# Patient Record
Sex: FEMALE | Race: WHITE | NOT HISPANIC OR LATINO | Employment: FULL TIME | URBAN - METROPOLITAN AREA
[De-identification: names, ages, dates, MRNs, and addresses within clinical notes are randomized per-mention and may not be internally consistent; named-entity substitution may affect disease eponyms.]

---

## 2017-01-03 ENCOUNTER — ANESTHESIA - HEALTHEAST (OUTPATIENT)
Dept: SURGERY | Facility: CLINIC | Age: 67
End: 2017-01-03

## 2017-01-03 ENCOUNTER — SURGERY - HEALTHEAST (OUTPATIENT)
Dept: SURGERY | Facility: CLINIC | Age: 67
End: 2017-01-03

## 2017-01-03 ASSESSMENT — MIFFLIN-ST. JEOR: SCORE: 1727.91

## 2017-02-04 ENCOUNTER — APPOINTMENT (OUTPATIENT)
Dept: GENERAL RADIOLOGY | Facility: CLINIC | Age: 67
End: 2017-02-04
Attending: PHYSICIAN ASSISTANT
Payer: COMMERCIAL

## 2017-02-04 ENCOUNTER — APPOINTMENT (OUTPATIENT)
Dept: GENERAL RADIOLOGY | Facility: CLINIC | Age: 67
End: 2017-02-04
Attending: EMERGENCY MEDICINE
Payer: COMMERCIAL

## 2017-02-04 ENCOUNTER — HOSPITAL ENCOUNTER (EMERGENCY)
Facility: CLINIC | Age: 67
Discharge: HOME | End: 2017-02-05
Attending: PHYSICIAN ASSISTANT | Admitting: PHYSICIAN ASSISTANT
Payer: COMMERCIAL

## 2017-02-04 DIAGNOSIS — S82.852A TRIMALLEOLAR FRACTURE OF ANKLE, CLOSED, LEFT, INITIAL ENCOUNTER: ICD-10-CM

## 2017-02-04 LAB — ETHANOL SERPL-MCNC: 0.16 G/DL

## 2017-02-04 PROCEDURE — 27818 TREATMENT OF ANKLE FRACTURE: CPT | Mod: LT

## 2017-02-04 PROCEDURE — 25000128 H RX IP 250 OP 636: Performed by: PHYSICIAN ASSISTANT

## 2017-02-04 PROCEDURE — 80320 DRUG SCREEN QUANTALCOHOLS: CPT | Performed by: PHYSICIAN ASSISTANT

## 2017-02-04 PROCEDURE — 73610 X-RAY EXAM OF ANKLE: CPT | Mod: LT

## 2017-02-04 PROCEDURE — 25000132 ZZH RX MED GY IP 250 OP 250 PS 637: Performed by: PHYSICIAN ASSISTANT

## 2017-02-04 PROCEDURE — 25000128 H RX IP 250 OP 636: Performed by: EMERGENCY MEDICINE

## 2017-02-04 PROCEDURE — 99285 EMERGENCY DEPT VISIT HI MDM: CPT | Mod: 25

## 2017-02-04 PROCEDURE — 40000275 ZZH STATISTIC RCP TIME EA 10 MIN

## 2017-02-04 PROCEDURE — 25000125 ZZHC RX 250

## 2017-02-04 PROCEDURE — 99152 MOD SED SAME PHYS/QHP 5/>YRS: CPT

## 2017-02-04 PROCEDURE — 40000965 ZZH STATISTIC END TITIAL CO2 MONITORING

## 2017-02-04 PROCEDURE — 96374 THER/PROPH/DIAG INJ IV PUSH: CPT

## 2017-02-04 PROCEDURE — 25000125 ZZHC RX 250: Performed by: EMERGENCY MEDICINE

## 2017-02-04 PROCEDURE — 96361 HYDRATE IV INFUSION ADD-ON: CPT

## 2017-02-04 PROCEDURE — 25000125 ZZHC RX 250: Performed by: PHYSICIAN ASSISTANT

## 2017-02-04 PROCEDURE — 40000940 XR ANKLE LT G/E 3 VW: Mod: LT

## 2017-02-04 PROCEDURE — 96375 TX/PRO/DX INJ NEW DRUG ADDON: CPT

## 2017-02-04 RX ORDER — FENTANYL CITRATE 50 UG/ML
50 INJECTION, SOLUTION INTRAMUSCULAR; INTRAVENOUS ONCE
Status: COMPLETED | OUTPATIENT
Start: 2017-02-04 | End: 2017-02-04

## 2017-02-04 RX ORDER — PROPOFOL 10 MG/ML
INJECTION, EMULSION INTRAVENOUS
Status: COMPLETED
Start: 2017-02-04 | End: 2017-02-04

## 2017-02-04 RX ORDER — OXYCODONE AND ACETAMINOPHEN 5; 325 MG/1; MG/1
2 TABLET ORAL ONCE
Status: COMPLETED | OUTPATIENT
Start: 2017-02-04 | End: 2017-02-04

## 2017-02-04 RX ORDER — PROPOFOL 10 MG/ML
120 INJECTION, EMULSION INTRAVENOUS ONCE
Status: COMPLETED | OUTPATIENT
Start: 2017-02-04 | End: 2017-02-04

## 2017-02-04 RX ORDER — OXYCODONE AND ACETAMINOPHEN 5; 325 MG/1; MG/1
1-2 TABLET ORAL EVERY 4 HOURS PRN
Qty: 20 TABLET | Refills: 0 | Status: SHIPPED | OUTPATIENT
Start: 2017-02-04 | End: 2017-02-04

## 2017-02-04 RX ORDER — ONDANSETRON 2 MG/ML
4 INJECTION INTRAMUSCULAR; INTRAVENOUS ONCE
Status: COMPLETED | OUTPATIENT
Start: 2017-02-04 | End: 2017-02-04

## 2017-02-04 RX ADMIN — PROPOFOL 120 MG: 10 INJECTION, EMULSION INTRAVENOUS at 23:09

## 2017-02-04 RX ADMIN — HYDROMORPHONE HYDROCHLORIDE 1 MG: 1 INJECTION, SOLUTION INTRAMUSCULAR; INTRAVENOUS; SUBCUTANEOUS at 23:28

## 2017-02-04 RX ADMIN — SODIUM CHLORIDE 1000 ML: 9 INJECTION, SOLUTION INTRAVENOUS at 22:39

## 2017-02-04 RX ADMIN — OXYCODONE HYDROCHLORIDE AND ACETAMINOPHEN 1 TABLET: 5; 325 TABLET ORAL at 21:09

## 2017-02-04 RX ADMIN — FENTANYL CITRATE 50 MCG: 50 INJECTION, SOLUTION INTRAMUSCULAR; INTRAVENOUS at 22:38

## 2017-02-04 RX ADMIN — FENTANYL CITRATE 50 MCG: 50 INJECTION, SOLUTION INTRAMUSCULAR; INTRAVENOUS at 22:54

## 2017-02-04 RX ADMIN — ONDANSETRON HYDROCHLORIDE 4 MG: 2 SOLUTION INTRAMUSCULAR; INTRAVENOUS at 22:35

## 2017-02-04 ASSESSMENT — ENCOUNTER SYMPTOMS
NECK PAIN: 0
ABDOMINAL PAIN: 0
HEADACHES: 0

## 2017-02-04 NOTE — ED AVS SNAPSHOT
Emergency Department    [address] [place] [id number]    Phone:  [phone]    Fax:  [phone]                                       Janelle Kolb   MRN: [id number]    Department:   Emergency Department   Date of Visit:  2/4/2017           After Visit Summary Signature Page     I have received my discharge instructions, and my questions have been answered. I have discussed any challenges I see with this plan with the nurse or doctor.    ..........................................................................................................................................  Patient/Patient Representative Signature      ..........................................................................................................................................  Patient Representative Print Name and Relationship to Patient    ..................................................               ................................................  Date                                            Time    ..........................................................................................................................................  Reviewed by Signature/Title    ...................................................              ..............................................  Date                                                            Time

## 2017-02-05 VITALS
OXYGEN SATURATION: 99 % | DIASTOLIC BLOOD PRESSURE: 73 MMHG | WEIGHT: 256 LBS | BODY MASS INDEX: 40.18 KG/M2 | HEIGHT: 67 IN | SYSTOLIC BLOOD PRESSURE: 145 MMHG | TEMPERATURE: 97.2 F | RESPIRATION RATE: 10 BRPM | HEART RATE: 67 BPM

## 2017-02-05 NOTE — DISCHARGE INSTRUCTIONS
Ankle Fracture    You have an ankle fracture. This means that one or more of the bones that make up the ankle joint are broken. This causes pain, swelling, and sometimes bruising.  A fracture is treated with a splint or cast or special boot. It will take about 4 to 6 weeks for the fracture to heal. Surgery may be needed to fix severe injuries.  Home care    You will be given a splint, cast or boot to prevent movement at the ankle joint. Unless you were told otherwise, use crutches or a walker. Don t weight on the injured leg until cleared by your healthcare provider to do so. Crutches and walkers can be rented at many pharmacies and surgical or orthopedic supply stores. Don t put weight on a splint. It will break.    Keep your leg elevated to reduce pain and swelling. When sleeping, place a pillow under the injured leg. When sitting, support the injured leg so it is level with your waist. This is very important during the first 48 hours.    Apply an ice pack over the injured area for no more than 15 to 20 minutes. Do this every 3 to 6 hours for the first 24 to 48 hours. Continue with ice packs 3 to 4 times a day for the next 2 days, then as needed to ease pain and swelling. To make an ice pack, put ice cubes in a plastic bag that seals at the top. Wrap the bag in a clean, thin towel or cloth. Never put ice or an ice pack directly on the skin. You can place the ice pack directly over the cast or splint. As the ice melts, be careful that the cast or splint doesn t get wet.    Keep the cast, splint, or boot completely dry at all times. Bathe with your cast, splint, or boot out of the water, protected with 2 large plastic bags. Place 1 bag outside of the other. Tape each bag with duct tape at the top end. Water can still leak in. So it's best to keep the cast, splint, or boot away from water. If a boot or fiberglass cast or splint gets wet, dry it with a hair dryer on a cool setting.    You may use over-the-counter  pain medicine to control pain, unless another pain medicine was prescribed. Talk with your provider beforeusing these medicines if you have chronic liver or kidney disease or ever had a stomach ulcer or GI bleeding.  Follow-up care  Follow up with your healthcare provider in 1 week, or as advised. This is to be sure the bone is healing properly. If you were given a splint, it may be changed to a cast at your follow-up visit.  If X-rays were taken, you will be told of any new findings that may affect your care.  When to seek medical advice  Call your healthcare provider right away if any of these occur:    The plaster cast or splint becomes wet or soft    The fiberglass cast or splint stays wet for more than 24 hours    There is increased tightness, sore areas, or pain under the cast or splint    Your toes become swollen, cold, blue, numb, or tingly    The cast becomes loose    The cast has a bad smell    The cast develops cracks or breaks     8817-0961 [employer]. [address] [place] [address]. All rights reserved. This information is not intended as a substitute for professional medical care. Always follow your healthcare professional's instructions.      Discharge Instructions  Splint Care    You had a splint put on today to help protect your injury and help it heal.  Splints are used to treat things like strains, sprains, cuts and fractures (broken bones).    Be sure your splint is not too tight!  If you splint is too tight, it may cause loss of blood supply.  Signs of your splint being too tight include:  your arm or leg hurting a lot more; your fingers or toes getting numb, cold, pale or blue; or your child is crying, fussing or seeming restless.    Return to the Emergency Department right away if:    You have increased pain or pressure around the injury.    You have numbness, tingling, or cool, pale, or blue toes or fingers past the injury.    Your child is more fussy than normal,  crying a lot, or restless.    Your splint becomes soft, breaks, or is wet.    Your splint begins to smell bad.    Your splint is cutting into your skin.    Home care:    Keep the injured area above the level of your heart while laying or sitting down.  This will help decrease the swelling and the pain.    Keep the splint dry.    Do not put objects down or inside the splint.    If there is an elastic bandage (Ace  wrap) holding the splint on this may be loosened slightly to relieve pressure or pain.  If pain continues return to the Emergency Department right away.    Do not remove your splint by yourself unless told to by your doctor.    Follow-up:  Sometimes the splint put on in the Emergency Department needs to be changed once the swelling has gone down and a more permanent cast needs to be placed.  This is usually done by a bone specialist doctor (Orthopedist).  Follow the instructions given to you by your doctor today.    X-rays:  X-rays done today were read by your doctor but will also be read by a radiologist.  We will contact you if the radiologist sees anything different on the x-ray.  Your regular doctor may also want to review your x-rays on follow-up.    You could have a fracture (break), even if we told you your x-rays were normal. X-rays are not always certain, and some fractures are hard to see and may not show up right away.  Also, your x-ray may look like you have a fracture, even though you do not.  It is important to follow-up with your regular doctor.     If you were given a prescription for medicine here today, be sure to read all of the information (including the package insert) that comes with your prescription.  This will include important information about the medicine, its side effects, and any warnings that you need to know about.  The pharmacist who fills the prescription can provide more information and answer questions you may have about the medicine.  If you have questions or concerns  that the pharmacist cannot address, please call or return to the Emergency Department.   Opioid Medication Information    Pain medications are among the most commonly prescribed medicines, so we are including this information for all our patients. If you did not receive pain medication or get a prescription for pain medicine, you can ignore it.     You may have been given a prescription for an opioid (narcotic) pain medicine and/or have received a pain medicine while here in the Emergency Department. These medicines can make you drowsy or impaired. You must not drive, operate dangerous equipment, or engage in any other dangerous activities while taking these medications. If you drive while taking these medications, you could be arrested for DUI, or driving under the influence. Do not drink any alcohol while you are taking these medications.     Opioid pain medications can cause addiction. If you have a history of chemical dependency of any type, you are at a higher risk of becoming addicted to pain medications.  Only take these prescribed medications to treat your pain when all other options have been tried. Take it for as short a time and as few doses as possible. Store your pain pills in a secure place, as they are frequently stolen and provide a dangerous opportunity for children or visitors in your house to start abusing these powerful medications. We will not replace any lost or stolen medicine.  As soon as your pain is better, you should flush all your remaining medication.     Many prescription pain medications contain Tylenol  (acetaminophen), including Vicodin , Tylenol #3 , Norco , Lortab , and Percocet .  You should not take any extra pills of Tylenol  if you are using these prescription medications or you can get very sick.  Do not ever take more than 3000 mg of acetaminophen in any 24 hour period.    All opioids tend to cause constipation. Drink plenty of water and eat foods that have a lot of fiber,  such as fruits, vegetables, prune juice, apple juice and high fiber cereal.  Take a laxative if you don t move your bowels at least every other day. Miralax , Milk of Magnesia, Colace , or Senna  can be used to keep you regular.      Remember that you can always come back to the Emergency Department if you are not able to see your regular doctor in the amount of time listed above, if you get any new symptoms, or if there is anything that worries you.

## 2017-02-05 NOTE — ED PROVIDER NOTES
"  History     Chief Complaint:  Fall    HPI   Janelle Kolb is a 66 year old female who presents via EMS after a fall. The patient was leaving her neighbor's house after drinking wine, when she slipped and fell on the ice. She did hit her head, but did not lose consciousness. She thinks her head is ok, and does not currently have a headache. She is not on blood thinners. She did injure her left ankle when she fell, and this is what is causing her the most pain. She denies neck pain, chest pain, or abdominal pain. She also denies numbness or tingling on her toes. Of note, she had a cholecystectomy on 1/3. She reports last thing she drank/ate was wine at 1930.       Allergies:  Compazine  Erythromycin  Latex     Medications:    Femara  Coenzyme Q  Vitamin D  Ibuprofen  Estradiol  Metoprolol  Maxzide    Past Medical History:    HTN  Malignant neoplasm of breast  Neutropenia    Past Surgical History:    Breast surgery  Soft tissue surgery  Cystoscopy  Colonoscopy  Appendectomy  Lithotripsy   Gyn surgery    Family History:    Ovarian cancer  HTN  CAD    Social History:  Relationship status:   Tobacco use: Negative  Alcohol use: Positive  The patient presents via EMS.     Review of Systems   Cardiovascular: Negative for chest pain.   Gastrointestinal: Negative for abdominal pain.   Musculoskeletal: Negative for neck pain.        Positive for ankle pain.   Neurological: Negative for syncope and headaches.   All other systems reviewed and are negative.      Physical Exam   First Vitals:  BP: 146/76 mmHg  Temp: 97.2  F (36.2  C)  Temp src: Temporal  Pulse: 67  Resp: 18  SpO2: 98 %  Height: 170.2 cm (5' 7\")  Weight: 116.121 kg (256 lb)    Physical Exam  General: Alert, interactive, appears moderately uncomfortable with left ankle in splint.     Head: No palpable scalp hematoma. No art signs. No racoon eyes. No hemotympanum.     Eye:  Pupils are equal, round, and reactive.  Extraocular movements intact. No " nystagmus.     ENT:     No rhinorrhea.     Moist mucus membranes.     Neck: No rigidity. No midline tenderness. AROM intact.               Cardiac:  Regular rate and rhythm. S1, S2.  No murmurs, gallops, or rubs. Palpable left dorsalis pedal pulse with ease.     Pulmonary:  Clear to auscultation bilaterally.  No wheezes or crackles.             Non labored. No use of accessory muscles.     Abdomen:  Abdomen is soft and non-distended, without focal tenderness.     Musculoskeletal:  Freely moveable upper extremities. Left lower leg in splint. No tenderness of the the left knee, femur, proximal fibula or tibia tenderness. Left ankle swelling and diffuse tenderness.     Skin:  Warm and dry without rashes.    Neurologic: Alert. Non-slurred speech. No facial droop. Cranial nerves 2-12 intact. Symmetric upper and lower extremity strength. Cerebellar testing intact (finger to nose, nose to finger, alternating hands). GCS 15. Active left toe wiggle and sensation intact.     Psychiatric:  Awake. Appropriate interaction with examiner. Smells of alcohol.     Emergency Department Course     Imaging:  Radiographic findings were communicated with the patient who voiced understanding of the findings.    G/E 3 Views, Left Ankle XR, per radiology:  Trimalleolar fracture left ankle.    Laboratory:  Alcohol ethyl: 0.16 (H)    Interventions:  2109: Percocet, 1 tablet, PO  2238: Sublimaze, 50 mcg, IV  2235: Zofran, 4 mg, IV injection  Fentanyl 50mcg IV     Emergency Department Course:  Nursing notes and vitals reviewed.  I performed an exam of the patient as documented above.  The above workup was undertaken.  2149: I rechecked the patient and discussed results.  2200: I discussed the patient with Dr. Lerma of orthopedic surgery.    Dr. Alfa Franco will take over care for the patient.      Impression & Plan      Medical Decision Making:  Janelle Kolb is a 66 year old female who presents for evaluation of left ankle pain  secondary to a fall, and found to have left ankle trimalleolar fracture with instability. On exam, the patient has no clinical evidence suggestive of compartment syndrome, and has intact neurovascular status on left lower extremity. No clinical evidence of further proximal injury. In addition, she does note that she bumped her head, however, I do not appreciate any clinical evidence suggestive of severe head trauma. Her C-spine is cleared clinically. No clinical evidence suggestive of severe chest, abdominal, or other extremity injury. She was consuming alcohol this evening, and initially was unwilling to perform a breathalyzer test, and we waited for a blood alcohol level that came back at 0.16 (H). She was coherent, and hemodynamically stable. Given her well appearance I did not pursue head or neck CT imaging, as she is not on blood thinners and her spouse witnessed her fall, making her mechanism of injury less likely to cause severe head or neck trauma. I did speak with Dr. Lerma of orthopedics, and we discussed reduction of this fracture and splint placement with follow up in clinic next week, as this will need surgical repair. My supervising physician, Dr. Alfa Franco MD,was involved to perform the sedation and reduction. Please see his supervisory note. She notes that she has prescription pain medication from her recent gallbladder surgery, so I did not give further prescription. .     Diagnosis:    ICD-10-CM    1. Trimalleolar fracture of ankle, closed, left, initial encounter S82.852A        Disposition:  The patient is signed over to my colleague, Dr. Franco.    I, Khang Montano, am serving as a scribe on 2/4/2017 at 9:17 PM to personally document services performed by Vanessa Iniguez PA, based on my observations and the provider's statements to me.     EMERGENCY DEPARTMENT        Vanessa Iniguez PA-C  02/04/17 2080

## 2017-02-05 NOTE — ED NOTES
Chief Complaint:  Fall    HPI    Janelle Kolb is a 66 year old female who presents via EMS after a fall. The patient was leaving her neighbor's house after drinking wine, when she slipped and fell on the icy grass. She did hit her head, but did not lose consciousness. She thinks her head is ok, and does not currently have a headache. She is not on blood thinners. She did injure her left ankle when she fell, and this is what is causing her the most pain. She denies neck pain, chest pain, or abdominal pain. She also denies numbness or tingling on her toes. Of note, she had a cholecystectomy on 1/3. She reports last thing she drank/ate was wine at 1930.    Allergies:  Compazine  Erythromycin  Latex     Medications:     Femara  Coenzyme Q  Vitamin D  Ibuprofen  Estradiol  Metoprolol  Maxzide    Past Medical History:     HTN  Malignant neoplasm of breast  Neutropenia    Past Surgical History:     Breast surgery  Soft tissue surgery  Cystoscopy  Colonoscopy  Appendectomy  Lithotripsy    Gyn surgery    Family History:     Ovarian cancer  HTN  CAD    Social History:  Relationship status:   Tobacco use: Negative  Alcohol use: Positive  The patient presents via EMS.     Review of Systems   Cardiovascular: Negative for chest pain.   Gastrointestinal: Negative for abdominal pain.   Musculoskeletal: Negative for neck pain.         Positive for ankle pain.   Neurological: Negative for syncope and headaches.   All other systems reviewed and are negative.    Physical Exam:  Nursing note and vitals reviewed.  Constitutional:  Oriented to person, place, and time. Cooperative.   HENT:   Nose:    Nose normal.   Mouth/Throat:   Mucous membranes are normal.   Eyes:    Conjunctivae normal and EOM are normal.      Pupils are equal, round, and reactive to light.   Neck:    Trachea normal.   Cardiovascular:  Normal rate, regular rhythm, normal heart sounds and normal pulses. No murmur heard. 2+ DP pulse on the  "left.  Pulmonary/Chest:  Effort normal and breath sounds normal.   Abdominal:   Soft. Normal appearance and bowel sounds are normal.      There is slight tenderness to palpation in the right upper quadrant.     There is no rebound and no CVA tenderness.   Musculoskeletal:  Swelling and obvious deformity to the left ankle which is tender to palpation.  Lymphadenopathy:  No cervical adenopathy.   Neurological:   Alert and oriented to person, place, and time. Normal strength.      No cranial nerve deficit or sensory deficit. GCS eye subscore is 4. GCS verbal subscore is 5. GCS motor subscore is 6.   Skin:    Skin is intact. No rash noted.   Psychiatric:   Normal mood and affect.      Vitals:  Patient Vitals for the past 24 hrs:   BP Temp Temp src Pulse Heart Rate Resp SpO2 Height Weight   02/04/17 2320 120/72 mmHg - - - 64 12 100 % - -   02/04/17 2315 135/69 mmHg - - - 73 23 100 % - -   02/04/17 2310 134/77 mmHg - - - 77 9 100 % - -   02/04/17 2300 143/71 mmHg - - - 68 11 100 % - -   02/04/17 2253 - - - - 66 13 97 % - -   02/04/17 2248 154/75 mmHg - - - 69 15 97 % - -   02/04/17 2237 148/87 mmHg - - - 71 15 96 % - -   02/04/17 2158 155/84 mmHg - - - 69 15 99 % - -   02/04/17 2044 146/76 mmHg 97.2  F (36.2  C) Temporal 67 - 18 98 % 1.702 m (5' 7\") 116.121 kg (256 lb)     Procedure:     Reduction     SITE: Left ankle     PROCEDURE PROVIDER: Dr. Franco    CONSENT: Risks (including but not limited to: decreased respirations, oxygen perfusion, aspiration, hypotension), benefits, and alternatives were discussed with the patient and consent for procedure was obtained.     MONITORING: Monitoring consisted of heart rate, cardiac monitor, continuous pulse oximetry, continuous capnometry, frequent blood pressure checks, level of consciousness, IV access, constant attendance by RN until patient recovered, constant attendance by MD until patient stable and intubation and emergency airway management equipment available. "     REDUCTION COMMENTS: The patient's left ankle was pulled out in traction until better anatomical alignment. The patient's left ankle then appeared reduced with improved alignment. Post reductions X-rays were obtained and showed good reduction.     PATIENT STATUS: Dislocation alignment improved post procedure and both sensation and circulation are intact. Vital signs remained stable, airway patent, and O2 saturations remained above 95%. Post-procedure, the patient was alert and responds to verbal stimuli. Patient was monitored during recovery and returned to pre-procedure baseline.           Sedation:      PERFORMED BY: Dr. Franco    Pre-Procedure Assessment done at 2300.    EXPECTED LEVEL:  Deep Sedation    INDICATION:  Sedation is required to allow for fracture reduction    Consent obtained from patient after discussing the risks, benefits and alternatives.    PO INTAKE: Appropriately NPO for procedure    ASA CLASS: Class 2 - MILD SYSTEMIC DISEASE, NO ACUTE PROBLEMS, NO FUNCTIONAL LIMITATIONS.    MALLAMPATI: Grade 1:  Soft palate, uvula, tonsillar pillars, and posterior pharyngeal wall visible    LUNGS: Lungs Clear with good breath sounds bilaterally.     HEART: Normal heart sounds and rate    History and physical reviewed and no updates needed. I have reviewed the lab findings, diagnostic data, medications, and the plan for sedation. I have determined this patient to be an appropriate candidate for the planned sedation and procedure and have reassessed the patient IMMEDIATELY PRIOR to sedation and procedure.    Sedation Post Procedure Summary:    Prior to the start of the procedure and with procedural staff participation, I verbally confirmed the patient s identity using two indicators, relevant allergies, that the procedure was appropriate and matched the consent or emergent situation, and that the correct equipment/implants were available. Immediately prior to starting the procedure I conducted the Time  Out with the procedural staff and re-confirmed the patient s name, procedure, and site/side. (The Joint Commission universal protocol was followed.)  Yes      SEDATIVES: Propofol    Vital signs, airway, End Tidal CO2 and pulse oximetry were monitored and remained stable throughout the procedure and sedation was maintained until the procedure was complete.  The patient was monitored by staff until sedation discharge criteria were met.    PATIENT TOLERANCE: Patient tolerated the procedure well with no immediate complications.    TIME OF SEDATION IN MINUTES:  15 minutes from beginning to end of physician one to one monitoring.      Medical Decision Making:  This is a 66 year old female who was first seen by Libby Iniguez, who was found to have a trimalleolar fracture/dislocation of her left ankle. She required sedation for reduction of this and splint placement. I subsequently reduced this per the above procedure notes. She was placed in a short leg splint made of plaster and consisting of posterior and stir up components. She prefers to go home, which I think is reasonable. She has pain medication at home which she will use. She is to follow up with [facility] or the orthopedist of her choice.     Diagnosis:    ICD-10-CM    1. Trimalleolar fracture of ankle, closed, left, initial encounter S82.852A        Scribe Disclosure:  I, Yvonne Boswell, am serving as a scribe at 11:00 PM on 2/5/2017 to document services personally performed byAlfa Franco MD, based on my observations and the provider's statements to me.    2/5/2017    EMERGENCY DEPARTMENT    Alfa Franco MD  02/05/17 0701

## 2017-02-05 NOTE — ED NOTES
Bed: FT01  Expected date:   Expected time:   Means of arrival:   Comments:  428 66F ankle lac from fall to FT 1

## 2017-04-13 ENCOUNTER — HOSPITAL ENCOUNTER (OUTPATIENT)
Dept: OCCUPATIONAL THERAPY | Facility: CLINIC | Age: 67
Setting detail: THERAPIES SERIES
End: 2017-04-13
Payer: COMMERCIAL

## 2017-04-13 PROCEDURE — 40000445 ZZHC STATISTIC OT VISIT, LYMPHEDEMA

## 2017-04-13 PROCEDURE — 97140 MANUAL THERAPY 1/> REGIONS: CPT | Mod: GO

## 2017-04-27 ENCOUNTER — HOSPITAL ENCOUNTER (OUTPATIENT)
Dept: OCCUPATIONAL THERAPY | Facility: CLINIC | Age: 67
Setting detail: THERAPIES SERIES
End: 2017-04-27
Payer: COMMERCIAL

## 2017-04-27 PROCEDURE — 97140 MANUAL THERAPY 1/> REGIONS: CPT | Mod: GO

## 2017-04-27 PROCEDURE — 40000445 ZZHC STATISTIC OT VISIT, LYMPHEDEMA

## 2017-05-25 ENCOUNTER — HOSPITAL ENCOUNTER (OUTPATIENT)
Dept: OCCUPATIONAL THERAPY | Facility: CLINIC | Age: 67
Setting detail: THERAPIES SERIES
End: 2017-05-25
Payer: COMMERCIAL

## 2017-05-25 PROCEDURE — 97140 MANUAL THERAPY 1/> REGIONS: CPT | Mod: GO

## 2017-05-25 PROCEDURE — 40000445 ZZHC STATISTIC OT VISIT, LYMPHEDEMA

## 2017-08-03 ENCOUNTER — HOSPITAL ENCOUNTER (OUTPATIENT)
Dept: OCCUPATIONAL THERAPY | Facility: CLINIC | Age: 67
Setting detail: THERAPIES SERIES
End: 2017-08-03
Payer: COMMERCIAL

## 2017-08-03 PROCEDURE — 97140 MANUAL THERAPY 1/> REGIONS: CPT | Mod: GO

## 2017-08-03 PROCEDURE — 40000445 ZZHC STATISTIC OT VISIT, LYMPHEDEMA

## 2017-09-05 ENCOUNTER — HOSPITAL ENCOUNTER (OUTPATIENT)
Dept: OCCUPATIONAL THERAPY | Facility: CLINIC | Age: 67
Setting detail: THERAPIES SERIES
End: 2017-09-05
Payer: COMMERCIAL

## 2017-09-05 PROCEDURE — 40000445 ZZHC STATISTIC OT VISIT, LYMPHEDEMA

## 2017-09-05 PROCEDURE — 97140 MANUAL THERAPY 1/> REGIONS: CPT | Mod: GO

## 2017-09-20 ENCOUNTER — TRANSFERRED RECORDS (OUTPATIENT)
Dept: HEALTH INFORMATION MANAGEMENT | Facility: CLINIC | Age: 67
End: 2017-09-20

## 2017-11-13 ENCOUNTER — HOSPITAL ENCOUNTER (OUTPATIENT)
Dept: OCCUPATIONAL THERAPY | Facility: CLINIC | Age: 67
Setting detail: THERAPIES SERIES
End: 2017-11-13
Payer: COMMERCIAL

## 2017-11-13 PROCEDURE — 40000445 ZZHC STATISTIC OT VISIT, LYMPHEDEMA

## 2017-11-13 PROCEDURE — 97140 MANUAL THERAPY 1/> REGIONS: CPT | Mod: GO

## 2017-12-11 ENCOUNTER — HOSPITAL ENCOUNTER (OUTPATIENT)
Dept: OCCUPATIONAL THERAPY | Facility: CLINIC | Age: 67
Setting detail: THERAPIES SERIES
End: 2017-12-11
Payer: COMMERCIAL

## 2017-12-11 PROCEDURE — 40000445 ZZHC STATISTIC OT VISIT, LYMPHEDEMA

## 2017-12-11 PROCEDURE — 97140 MANUAL THERAPY 1/> REGIONS: CPT | Mod: GO

## 2018-01-08 ENCOUNTER — HOSPITAL ENCOUNTER (OUTPATIENT)
Dept: OCCUPATIONAL THERAPY | Facility: CLINIC | Age: 68
Setting detail: THERAPIES SERIES
End: 2018-01-08
Payer: COMMERCIAL

## 2018-01-08 PROCEDURE — 97140 MANUAL THERAPY 1/> REGIONS: CPT | Mod: GO

## 2018-01-08 PROCEDURE — 40000445 ZZHC STATISTIC OT VISIT, LYMPHEDEMA

## 2018-02-05 ENCOUNTER — HOSPITAL ENCOUNTER (OUTPATIENT)
Dept: OCCUPATIONAL THERAPY | Facility: CLINIC | Age: 68
Setting detail: THERAPIES SERIES
End: 2018-02-05
Payer: COMMERCIAL

## 2018-02-05 PROCEDURE — 40000445 ZZHC STATISTIC OT VISIT, LYMPHEDEMA

## 2018-02-05 PROCEDURE — 97140 MANUAL THERAPY 1/> REGIONS: CPT | Mod: GO

## 2018-02-26 ENCOUNTER — RECORDS - HEALTHEAST (OUTPATIENT)
Dept: ADMINISTRATIVE | Facility: OTHER | Age: 68
End: 2018-02-26

## 2018-02-27 ENCOUNTER — HOSPITAL ENCOUNTER (OUTPATIENT)
Dept: ULTRASOUND IMAGING | Facility: HOSPITAL | Age: 68
Discharge: HOME | End: 2018-02-27
Attending: UROLOGY

## 2018-02-27 ENCOUNTER — RECORDS - HEALTHEAST (OUTPATIENT)
Dept: ADMINISTRATIVE | Facility: OTHER | Age: 68
End: 2018-02-27

## 2018-02-27 ENCOUNTER — HOSPITAL ENCOUNTER (OUTPATIENT)
Dept: CT IMAGING | Facility: HOSPITAL | Age: 68
Discharge: HOME | End: 2018-02-27
Attending: UROLOGY

## 2018-02-27 DIAGNOSIS — N20.0 CALCULUS OF KIDNEY: ICD-10-CM

## 2018-02-27 DIAGNOSIS — N20.0 KIDNEY STONE: ICD-10-CM

## 2018-03-01 ENCOUNTER — RECORDS - HEALTHEAST (OUTPATIENT)
Dept: ADMINISTRATIVE | Facility: OTHER | Age: 68
End: 2018-03-01

## 2018-03-05 ENCOUNTER — HOSPITAL ENCOUNTER (OUTPATIENT)
Dept: OCCUPATIONAL THERAPY | Facility: CLINIC | Age: 68
Setting detail: THERAPIES SERIES
End: 2018-03-05
Payer: COMMERCIAL

## 2018-03-05 PROCEDURE — 40000445 ZZHC STATISTIC OT VISIT, LYMPHEDEMA

## 2018-03-05 PROCEDURE — 97140 MANUAL THERAPY 1/> REGIONS: CPT | Mod: GO

## 2018-03-07 ENCOUNTER — HOSPITAL ENCOUNTER (OUTPATIENT)
Dept: RADIOLOGY | Facility: HOSPITAL | Age: 68
Discharge: HOME | End: 2018-03-07
Attending: UROLOGY

## 2018-03-07 DIAGNOSIS — N20.0 CALCULUS OF KIDNEY: ICD-10-CM

## 2018-04-12 ENCOUNTER — HOSPITAL ENCOUNTER (OUTPATIENT)
Dept: OCCUPATIONAL THERAPY | Facility: CLINIC | Age: 68
Setting detail: THERAPIES SERIES
End: 2018-04-12
Payer: COMMERCIAL

## 2018-04-12 PROCEDURE — 40000445 ZZHC STATISTIC OT VISIT, LYMPHEDEMA

## 2018-04-12 PROCEDURE — 97140 MANUAL THERAPY 1/> REGIONS: CPT | Mod: GO

## 2018-05-10 ENCOUNTER — HOSPITAL ENCOUNTER (OUTPATIENT)
Dept: OCCUPATIONAL THERAPY | Facility: CLINIC | Age: 68
Setting detail: THERAPIES SERIES
End: 2018-05-10
Payer: COMMERCIAL

## 2018-05-10 PROCEDURE — 40000445 ZZHC STATISTIC OT VISIT, LYMPHEDEMA

## 2018-05-10 PROCEDURE — 97140 MANUAL THERAPY 1/> REGIONS: CPT | Mod: GO

## 2018-07-09 ENCOUNTER — HOSPITAL ENCOUNTER (OUTPATIENT)
Dept: OCCUPATIONAL THERAPY | Facility: CLINIC | Age: 68
Setting detail: THERAPIES SERIES
End: 2018-07-09
Payer: COMMERCIAL

## 2018-07-09 PROCEDURE — 40000445 ZZHC STATISTIC OT VISIT, LYMPHEDEMA

## 2018-07-09 PROCEDURE — 97140 MANUAL THERAPY 1/> REGIONS: CPT | Mod: GO

## 2018-08-06 ENCOUNTER — HOSPITAL ENCOUNTER (OUTPATIENT)
Dept: OCCUPATIONAL THERAPY | Facility: CLINIC | Age: 68
Setting detail: THERAPIES SERIES
End: 2018-08-06
Payer: COMMERCIAL

## 2018-08-06 PROCEDURE — 40000445 ZZHC STATISTIC OT VISIT, LYMPHEDEMA

## 2018-08-06 PROCEDURE — 97140 MANUAL THERAPY 1/> REGIONS: CPT | Mod: GO

## 2018-09-04 ENCOUNTER — HOSPITAL ENCOUNTER (OUTPATIENT)
Dept: OCCUPATIONAL THERAPY | Facility: CLINIC | Age: 68
Setting detail: THERAPIES SERIES
End: 2018-09-04
Payer: COMMERCIAL

## 2018-09-04 PROCEDURE — 97140 MANUAL THERAPY 1/> REGIONS: CPT | Mod: GO

## 2018-09-04 PROCEDURE — 40000445 ZZHC STATISTIC OT VISIT, LYMPHEDEMA

## 2018-09-13 ENCOUNTER — HOSPITAL ENCOUNTER (OUTPATIENT)
Dept: BONE DENSITY | Facility: CLINIC | Age: 68
Discharge: HOME | End: 2018-09-13
Attending: INTERNAL MEDICINE | Admitting: INTERNAL MEDICINE
Payer: COMMERCIAL

## 2018-09-13 DIAGNOSIS — C50.919 MALIGNANT NEOPLASM OF FEMALE BREAST, UNSPECIFIED ESTROGEN RECEPTOR STATUS, UNSPECIFIED LATERALITY, UNSPECIFIED SITE OF BREAST (H): ICD-10-CM

## 2018-09-13 DIAGNOSIS — M85.80 OSTEOPENIA, UNSPECIFIED LOCATION: ICD-10-CM

## 2018-09-13 PROCEDURE — 77080 DXA BONE DENSITY AXIAL: CPT

## 2018-09-18 ENCOUNTER — TRANSFERRED RECORDS (OUTPATIENT)
Dept: HEALTH INFORMATION MANAGEMENT | Facility: CLINIC | Age: 68
End: 2018-09-18

## 2018-10-29 ENCOUNTER — HOSPITAL ENCOUNTER (OUTPATIENT)
Dept: OCCUPATIONAL THERAPY | Facility: CLINIC | Age: 68
Setting detail: THERAPIES SERIES
End: 2018-10-29
Attending: INTERNAL MEDICINE
Payer: COMMERCIAL

## 2018-10-29 PROCEDURE — 97140 MANUAL THERAPY 1/> REGIONS: CPT | Mod: GO

## 2018-10-29 PROCEDURE — 40000445 ZZHC STATISTIC OT VISIT, LYMPHEDEMA

## 2018-11-26 ENCOUNTER — HOSPITAL ENCOUNTER (OUTPATIENT)
Dept: OCCUPATIONAL THERAPY | Facility: CLINIC | Age: 68
Setting detail: THERAPIES SERIES
End: 2018-11-26
Attending: INTERNAL MEDICINE
Payer: COMMERCIAL

## 2018-11-26 PROCEDURE — 97140 MANUAL THERAPY 1/> REGIONS: CPT | Mod: GO

## 2018-11-26 PROCEDURE — 40000445 ZZHC STATISTIC OT VISIT, LYMPHEDEMA

## 2018-12-26 ENCOUNTER — HOSPITAL ENCOUNTER (OUTPATIENT)
Dept: OCCUPATIONAL THERAPY | Facility: CLINIC | Age: 68
Setting detail: THERAPIES SERIES
End: 2018-12-26
Attending: INTERNAL MEDICINE
Payer: COMMERCIAL

## 2018-12-26 PROCEDURE — 97140 MANUAL THERAPY 1/> REGIONS: CPT | Mod: GO

## 2019-01-31 ENCOUNTER — HOSPITAL ENCOUNTER (OUTPATIENT)
Dept: OCCUPATIONAL THERAPY | Facility: CLINIC | Age: 69
Setting detail: THERAPIES SERIES
End: 2019-01-31
Attending: INTERNAL MEDICINE
Payer: COMMERCIAL

## 2019-01-31 PROCEDURE — 97140 MANUAL THERAPY 1/> REGIONS: CPT | Mod: GO

## 2019-02-28 ENCOUNTER — HOSPITAL ENCOUNTER (OUTPATIENT)
Dept: OCCUPATIONAL THERAPY | Facility: CLINIC | Age: 69
Setting detail: THERAPIES SERIES
End: 2019-02-28
Attending: INTERNAL MEDICINE
Payer: COMMERCIAL

## 2019-02-28 PROCEDURE — 97140 MANUAL THERAPY 1/> REGIONS: CPT | Mod: GO

## 2019-04-02 ENCOUNTER — HOSPITAL ENCOUNTER (OUTPATIENT)
Dept: OCCUPATIONAL THERAPY | Facility: CLINIC | Age: 69
Setting detail: THERAPIES SERIES
End: 2019-04-02
Attending: INTERNAL MEDICINE
Payer: COMMERCIAL

## 2019-04-02 DIAGNOSIS — I89.0 LYMPHEDEMA: Primary | ICD-10-CM

## 2019-04-02 PROCEDURE — 97140 MANUAL THERAPY 1/> REGIONS: CPT | Mod: GO

## 2019-04-30 ENCOUNTER — HOSPITAL ENCOUNTER (OUTPATIENT)
Dept: OCCUPATIONAL THERAPY | Facility: CLINIC | Age: 69
Setting detail: THERAPIES SERIES
End: 2019-04-30
Attending: INTERNAL MEDICINE
Payer: COMMERCIAL

## 2019-04-30 DIAGNOSIS — I89.0 LYMPHEDEMA: ICD-10-CM

## 2019-04-30 PROCEDURE — 97140 MANUAL THERAPY 1/> REGIONS: CPT | Mod: GO

## 2019-04-30 NOTE — PROGRESS NOTES
"   04/30/19 0950   Signing Clinician's Name / Credentials   Signing clinician's name / credentials Walker Allan, OTR/L, CLT   Session Number   Session Number PROGRESS NOTE   Goal 6   Goal identifier RUE/RUQ volume   Goal description For decreased risk infection/skin breakdown reduce RUE/RUQ lymphedema with skilled MLD.   Target date 04/03/20   Subjective Report   Subjective Report \"I've been getting this weird burning but numb pain in my R axilla, like unhappy nerves.  No pain down the arm anymore, I'm still doing the nerve glides\"   Manual Therapy   Manual Therapy Minutes (63678) 75   Skilled Intervention MLD, MFR   Patient Response Dramatic reduction RUE and lat trunk \"I don't have that numb feeling under my arm anymore\"  Patient verbalized understanding of towel oscillations for R lat trunk swelling \"they will help so much... I realize now it's probably just swelling in there causing the nerve pain\"   Treatment Detail Supine MLD to b/l venous angles, terminus, clav, ax, and parasternal LN, deep abd, skin drainage RUE, MFR R axilla, L side-lying skin drainage RUQ -> LUQ, MFR/soft tissue mob to BL pecs, scar mobs   Progress +progress to goal   Education   Education Notes towel oscillations for lat trunk swelling   Communication with other professionals   Communication with other professionals new order received from Lana Aguilar M.D. 4/3/2019   Plan   Home program R axillary self-stretch, chest opener stretches, radial nerve glides, towel oscillations   Updates to plan of care Skilled MLD/MFR 1 x monthly    Plan for next session MLD, MFR, soft tissue mob   Comments   Comments Despite compliance with HP, patient experiences exacerbations of lymphedema symptoms requiring periodic skilled MLD/MFR   Total Session Time   Timed Code Treatment Minutes 75   Total Treatment Time (sum of timed and untimed services) 75     "

## 2019-06-11 ENCOUNTER — HOSPITAL ENCOUNTER (OUTPATIENT)
Dept: OCCUPATIONAL THERAPY | Facility: CLINIC | Age: 69
Setting detail: THERAPIES SERIES
End: 2019-06-11
Attending: INTERNAL MEDICINE
Payer: COMMERCIAL

## 2019-06-11 PROCEDURE — 97140 MANUAL THERAPY 1/> REGIONS: CPT | Mod: GO

## 2019-07-09 ENCOUNTER — HOSPITAL ENCOUNTER (OUTPATIENT)
Dept: OCCUPATIONAL THERAPY | Facility: CLINIC | Age: 69
Setting detail: THERAPIES SERIES
End: 2019-07-09
Attending: INTERNAL MEDICINE
Payer: COMMERCIAL

## 2019-07-09 DIAGNOSIS — I89.0 LYMPHEDEMA OF ARM: Primary | ICD-10-CM

## 2019-07-09 PROCEDURE — 97140 MANUAL THERAPY 1/> REGIONS: CPT | Mod: GO

## 2019-08-06 ENCOUNTER — HOSPITAL ENCOUNTER (OUTPATIENT)
Dept: OCCUPATIONAL THERAPY | Facility: CLINIC | Age: 69
Setting detail: THERAPIES SERIES
End: 2019-08-06
Attending: INTERNAL MEDICINE
Payer: COMMERCIAL

## 2019-08-06 PROCEDURE — 97140 MANUAL THERAPY 1/> REGIONS: CPT | Mod: GO

## 2019-09-03 ENCOUNTER — HOSPITAL ENCOUNTER (OUTPATIENT)
Dept: OCCUPATIONAL THERAPY | Facility: CLINIC | Age: 69
Setting detail: THERAPIES SERIES
End: 2019-09-03
Attending: INTERNAL MEDICINE
Payer: COMMERCIAL

## 2019-09-03 PROCEDURE — 97535 SELF CARE MNGMENT TRAINING: CPT | Mod: GO

## 2019-09-03 PROCEDURE — 97140 MANUAL THERAPY 1/> REGIONS: CPT | Mod: GO

## 2019-10-29 NOTE — TELEPHONE ENCOUNTER
Fax from [facility] pharmacy came for new RX for letrozole.  Patient has not been seen here.  Needs to schedule appointment with Dr. Aguilar before we can send prescription.  This message was left for the patient.  Returning reply.

## 2019-10-29 NOTE — TELEPHONE ENCOUNTER
Pt calling back, writer gave her the information below.  Pt states she used to see Dr. Aguilar at the [facility] and was not aware that Dr. Aguilar left that clinic.  Pt plans to call [facility] tomorrow to see if she can get an appointment scheduled for a med refill.      Gayla Lombardo RN/LAMINE

## 2019-10-30 ENCOUNTER — PATIENT OUTREACH (OUTPATIENT)
Dept: ONCOLOGY | Facility: CLINIC | Age: 69
End: 2019-10-30

## 2019-10-30 DIAGNOSIS — C50.919 MALIGNANT NEOPLASM OF BREAST (H): ICD-10-CM

## 2019-10-30 RX ORDER — LETROZOLE 2.5 MG/1
2.5 TABLET, FILM COATED ORAL DAILY
Qty: 90 TABLET | Refills: 3 | Status: SHIPPED | OUTPATIENT
Start: 2019-10-30 | End: 2020-12-04

## 2019-10-30 NOTE — PROGRESS NOTES
Patient called clinic stating that she is transferring care from [facility] to here. She will be scheduled 11/11/19 with Dr. Aguilar at 0940. She needs her Letrozole refilled to [facility] in [place]. Script will be refilled for patient. Rajni Mlaone RN,BSN,OCN

## 2019-11-09 NOTE — PROGRESS NOTES
[facility]    Hematology/Oncology Established Patient Follow-up Note      Today's Date: 11/11/19    Reason for Follow-up: Bilateral breast cancer, status post bilateral mastectomies.    HISTORY OF PRESENT ILLNESS: Janelle Kolb is a 69 year old female who presents with the following oncologic history:  1.  12/03/2011: Biopsy of right breast at 9:00 showed proliferative changes including fibrosis, adenosis, apocrine metaplasia, and ductal hyperplasia without atypia, and associated microcalcifications with no evidence of malignancy.  2.  1/04/2012: Left upper inner breast biopsy showed atypical ductal hyperplasia and atypical lobular hyperplasia with associated calcifications.  3.  1/19/2012: Underwent lumpectomies at [facility] on the bilateral breasts.  Pathology showed a 0.5 x 0.4 x 0.4 cm left upper inner breast well-differentiated invasive ductal carcinoma, grade 1 with associated DCIS, lymphovascular invasion absent.  ER was positive at 75%, PA weakly positive at 3%, HER-2/olesya negative.  Right breast showed grade 1 invasive lobular carcinoma at 9:00, measuring 4 x 2 x 2 cm, multifocal with margins multifocally positive for invasive carcinoma; lymphovascular invasion present.  No lymph nodes were removed.  4.  3/07/2012: Underwent bilateral mastectomies with axillary lymph node dissection.  Pathology showed grade 2, left breast invasive mammary carcinoma with ductal differentiation measuring 3 mm and multifocal LCIS and focal DCIS; lymphovascular invasion not identified; 2/5 axillary lymph nodes involved with size of largest metastatic deposit 9.5 mm (later felt to be an intramammary lymph node); extranodal extension not identified.  Left-sided lymph node was ER positive at 75%, PA positive at 50%, HER-2/olesya negative by IHC.  Therefore, left breast [id number].  Pathology showed multifocal right breast invasive lobular carcinoma of overlapping sites.  Right breast showed the  multiple foci measuring up to 2.3 cm, invasive ductal carcinoma, grade 1 with 10/19 lymph nodes involved (8 with macro metastases and 2 with micrometastasis), with largest metastatic tumor deposit measuring 1.3 cm, extranodal extension not identified.  ER was positive at 97%, CA positive at 91%, HER-2/olesya negative with IHC = 0+.  Therefore, right breast pT2-pN3a.  5.  4/23/2012-10/01/2012: Received adjuvant chemotherapy with 4 cycles of dose dense Adriamycin and Cytoxan followed by 1 dose of Taxol and 3 cycles of Abraxane, under the care of Dr. Cassy Germain.  6.  12/27/2012: Completed adjuvant radiation therapy to the right chest wall.  7.  3/25/2013: Completed adjuvant radiation therapy to the left chest wall.  8.  6/2013: Started adjuvant letrozole.  This was interrupted between August and September 2013 due to side effects.      INTERIM HISTORY:  Janelle Kolb reports feeling overall well but has had some consistent hair thinning.  She recently had some digestive issues but those have resolved so she did not need any imaging work-up for that.  Otherwise, she denies any fevers, chills, night sweats, unintentional weight loss, or new bone pain.  She still has some arthralgias from the continued use of letrozole.      REVIEW OF SYSTEMS:   14 point ROS was reviewed and is negative other than as noted above in HPI.       HOME MEDICATIONS:  Current Outpatient Medications   Medication Sig Dispense Refill     Cholecalciferol (VITAMIN D) 1000 UNITS capsule Take 1 capsule by mouth daily 5, 000 iu       Coenzyme Q10 (CO Q 10 PO) Take 100 mg by mouth       ESTRADIOL 0.1MG/GM CREAM Insert 1 gram vaginally 2-3 times per week 30 g 6     ibuprofen (ADVIL,MOTRIN) 200 MG tablet Take 600 mg by mouth as needed.       letrozole (FEMARA) 2.5 MG tablet Take 1 tablet (2.5 mg) by mouth daily 90 tablet 3     metoprolol (LOPRESSOR) 50 MG tablet Take 50 mg by mouth daily.       order for DME Equipment being ordered: CARLOS lacey  compression garment, 30-40 mmHg, or quilted directional flow garment, 20-30 mmHg 1 each 0     triamterene-hydrochlorothiazide (MAXZIDE) 75-50 MG per tablet Take 1 tablet by mouth. Patient reports she has only been taking when she notices edema in her legs/ankles. Her prescription states to take 1 tablet po once daily.           ALLERGIES:  Allergies   Allergen Reactions     Compazine [Prochlorperazine] Other (See Comments)     Severe extra pyramidal side effects  (muscles spasms, shakiness)     Erythromycin GI Disturbance     Latex Difficulty breathing         PAST MEDICAL HISTORY:  Past Medical History:   Diagnosis Date     Hypertension      Malignant neoplasm (H) 1/23/2012    BILATERAL BREAST CANCER INTO LYMPH NODES   BRCA2 mutation testing in 2004 was negative.  Positive history of hormone replacement therapy, discontinued in 2008.  Kidney stones.  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      PAST SURGICAL HISTORY:  Past Surgical History:   Procedure Laterality Date     ABDOMEN SURGERY       APPENDECTOMY       BIOPSY       BREAST SURGERY       CHOLECYSTECTOMY       COLONOSCOPY       CYSTOSCOPY      STENTS IN PAST X 2, THEN REMOVED     GENITOURINARY SURGERY       GI SURGERY       GYN SURGERY       LITHOTRIPSY       ORTHOPEDIC SURGERY       SOFT TISSUE SURGERY           SOCIAL HISTORY:  Social History     Socioeconomic History     Marital status:      Spouse name: Not on file     Number of children: Not on file     Years of education: Not on file     Highest education level: Not on file   Occupational History     Not on file   Social Needs     Financial resource strain: Not on file     Food insecurity:     Worry: Not on file     Inability: Not on file     Transportation needs:     Medical: Not on file     Non-medical: Not on file   Tobacco Use     Smoking status: Never Smoker     Smokeless tobacco: Never Used   Substance and Sexual Activity     Alcohol use: Yes     Comment: occasional     Drug use:  "No     Sexual activity: Not on file   Lifestyle     Physical activity:     Days per week: Not on file     Minutes per session: Not on file     Stress: Not on file   Relationships     Social connections:     Talks on phone: Not on file     Gets together: Not on file     Attends Mormonism service: Not on file     Active member of club or organization: Not on file     Attends meetings of clubs or organizations: Not on file     Relationship status: Not on file     Intimate partner violence:     Fear of current or ex partner: Not on file     Emotionally abused: Not on file     Physically abused: Not on file     Forced sexual activity: Not on file   Other Topics Concern     Parent/sibling w/ CABG, MI or angioplasty before 65F 55M? 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      FAMILY HISTORY:  Family History   Problem Relation Age of Onset     Cancer Mother         ovarian,  at age 70     Hypertension Mother      C.A.D. Mother      Hypertension Father      Breast Cancer Other 59        cousin         PHYSICAL EXAM:  Vital signs:  BP (!) 142/79   Pulse 75   Resp 16   Ht 1.702 m (5' 7\")   Wt 117.9 kg (260 lb)   LMP 2008   SpO2 94%   BMI 40.72 kg/m     ECO  GENERAL/CONSTITUTIONAL: No acute distress.  EYES: Extraocular movements intact.  No scleral icterus.  ENT/MOUTH: Neck supple. Oropharynx clear, no mucositis.  LYMPH: No anterior cervical, posterior cervical, supraclavicular, axillary or inguinal adenopathy.   BREAST: Bilateral breasts are surgically absent with no palpable chest wall masses or fluid.  No erythema, rash, or ulcerations.  RESPIRATORY: Clear to auscultation bilaterally. No crackles or wheezing.   CARDIOVASCULAR: Regular rate and rhythm without murmurs, gallops, or rubs.  GASTROINTESTINAL: No hepatosplenomegaly, masses, or tenderness. No guarding.  No distention.  MUSCULOSKELETAL: Warm and well-perfused, no cyanosis, " clubbing, or edema.  NEUROLOGIC: Cranial nerves II-XII are intact. Alert, oriented, answers questions appropriately.  INTEGUMENTARY: Slightly scaly area at the central upper chest.  GAIT: Steady, does not use assistive device      LABS:  None.    PATHOLOGY:  None new.    IMAGING:  None new.    ASSESSMENT/PLAN:  Janelle Kolb is a 69 year old female with the following issues:  1.  Stage IIIA, right breast invasive lobular carcinoma of overlapping sites, grade 1, ER positive, AR positive, HER-2/olesya negative status post right mastectomy  2.  Stage II, left upper inner breast invasive ductal carcinoma, grade 2, ER positive, AR positive, HER-2/olesya negative, status post left mastectomy  3.  Morbid obesity  -I discussed with Janelle that she has no clinical evidence for recurrent breast cancer by physical exam.  She will continue on letrozole which she is tolerating well.  I recommended up to a total of 10 years of adjuvant endocrine therapy given her lymph node positive disease at time of diagnosis.  -Plan to repeat DEXA scan every 2 years, next due in 9/2020.  4.  Vitamin D deficiency  - Continue vitamin D supplementation.  She declines taking calcium supplementation due to history of kidney stones.  5.  Peripheral neuropathy, drug-induced  -This is related to past taxing use but mild in her hands and feet.  Continue observation.  6.  Atrophic vaginitis  -Stable.  Continue vaginal estrogen cream.  7.  Hair thinning  -She will follow-up with dermatology for different methods to help with her with her thinning.    Return in 6 months, then yearly thereafter.      Lana Aguilar MD  Hematology/Oncology  [facility]    I spent a total of 25 minutes with the patient, with greater than 50% of the time in counseling and coordination of care.

## 2019-11-11 ENCOUNTER — PRE VISIT (OUTPATIENT)
Dept: ONCOLOGY | Facility: CLINIC | Age: 69
End: 2019-11-11

## 2019-11-11 ENCOUNTER — ONCOLOGY VISIT (OUTPATIENT)
Dept: ONCOLOGY | Facility: CLINIC | Age: 69
End: 2019-11-11
Attending: INTERNAL MEDICINE
Payer: COMMERCIAL

## 2019-11-11 VITALS
OXYGEN SATURATION: 94 % | WEIGHT: 260 LBS | DIASTOLIC BLOOD PRESSURE: 79 MMHG | BODY MASS INDEX: 40.81 KG/M2 | SYSTOLIC BLOOD PRESSURE: 142 MMHG | HEART RATE: 75 BPM | HEIGHT: 67 IN | RESPIRATION RATE: 16 BRPM

## 2019-11-11 DIAGNOSIS — C50.212 MALIGNANT NEOPLASM OF UPPER-INNER QUADRANT OF LEFT BREAST IN FEMALE, ESTROGEN RECEPTOR POSITIVE (H): ICD-10-CM

## 2019-11-11 DIAGNOSIS — G62.0 DRUG-INDUCED POLYNEUROPATHY (H): ICD-10-CM

## 2019-11-11 DIAGNOSIS — L65.9 HAIR THINNING: ICD-10-CM

## 2019-11-11 DIAGNOSIS — N95.2 ATROPHIC VAGINITIS: ICD-10-CM

## 2019-11-11 DIAGNOSIS — Z17.0 MALIGNANT NEOPLASM OF UPPER-OUTER QUADRANT OF LEFT BREAST IN FEMALE, ESTROGEN RECEPTOR POSITIVE (H): Primary | ICD-10-CM

## 2019-11-11 DIAGNOSIS — C50.412 MALIGNANT NEOPLASM OF UPPER-OUTER QUADRANT OF LEFT BREAST IN FEMALE, ESTROGEN RECEPTOR POSITIVE (H): Primary | ICD-10-CM

## 2019-11-11 DIAGNOSIS — Z17.0 MALIGNANT NEOPLASM OF UPPER-INNER QUADRANT OF LEFT BREAST IN FEMALE, ESTROGEN RECEPTOR POSITIVE (H): ICD-10-CM

## 2019-11-11 PROCEDURE — 99214 OFFICE O/P EST MOD 30 MIN: CPT | Performed by: INTERNAL MEDICINE

## 2019-11-11 PROCEDURE — G0463 HOSPITAL OUTPT CLINIC VISIT: HCPCS

## 2019-11-11 RX ORDER — LETROZOLE 2.5 MG/1
2.5 TABLET, FILM COATED ORAL
COMMUNITY
Start: 2014-01-13 | End: 2019-11-11

## 2019-11-11 ASSESSMENT — MIFFLIN-ST. JEOR: SCORE: 1741.98

## 2019-11-11 ASSESSMENT — PAIN SCALES - GENERAL: PAINLEVEL: MODERATE PAIN (4)

## 2019-11-11 NOTE — LETTER
11/11/2019         RE: Janelle Kolb  [address] [id number]        Dear Colleague,    Thank you for referring your patient, Janelle Kolb, to the [facility]. Please see a copy of my visit note below.    [facility]    Hematology/Oncology Established Patient Follow-up Note      Today's Date: 11/11/19    Reason for Follow-up: Bilateral breast cancer, status post bilateral mastectomies.    HISTORY OF PRESENT ILLNESS: Janelle Kolb is a 69 year old female who presents with the following oncologic history:  1.  12/03/2011: Biopsy of right breast at 9:00 showed proliferative changes including fibrosis, adenosis, apocrine metaplasia, and ductal hyperplasia without atypia, and associated microcalcifications with no evidence of malignancy.  2.  1/04/2012: Left upper inner breast biopsy showed atypical ductal hyperplasia and atypical lobular hyperplasia with associated calcifications.  3.  1/19/2012: Underwent lumpectomies at [facility] on the bilateral breasts.  Pathology showed a 0.5 x 0.4 x 0.4 cm left upper inner breast well-differentiated invasive ductal carcinoma, grade 1 with associated DCIS, lymphovascular invasion absent.  ER was positive at 75%, DC weakly positive at 3%, HER-2/olesya negative.  Right breast showed grade 1 invasive lobular carcinoma at 9:00, measuring 4 x 2 x 2 cm, multifocal with margins multifocally positive for invasive carcinoma; lymphovascular invasion present.  No lymph nodes were removed.  4.  3/07/2012: Underwent bilateral mastectomies with axillary lymph node dissection.  Pathology showed grade 2, left breast invasive mammary carcinoma with ductal differentiation measuring 3 mm and multifocal LCIS and focal DCIS; lymphovascular invasion not identified; 2/5 axillary lymph nodes involved with size of largest metastatic deposit 9.5 mm (later felt to be an intramammary lymph node); extranodal extension  not identified.  Left-sided lymph node was ER positive at 75%, MT positive at 50%, HER-2/olesya negative by IHC.  Therefore, left breast [id number].  Pathology showed multifocal right breast invasive lobular carcinoma of overlapping sites.  Right breast showed the multiple foci measuring up to 2.3 cm, invasive ductal carcinoma, grade 1 with 10/19 lymph nodes involved (8 with macro metastases and 2 with micrometastasis), with largest metastatic tumor deposit measuring 1.3 cm, extranodal extension not identified.  ER was positive at 97%, MT positive at 91%, HER-2/olesya negative with IHC = 0+.  Therefore, right breast pT2-pN3a.  5.  4/23/2012-10/01/2012: Received adjuvant chemotherapy with 4 cycles of dose dense Adriamycin and Cytoxan followed by 1 dose of Taxol and 3 cycles of Abraxane, under the care of Dr. Cassy Germain.  6.  12/27/2012: Completed adjuvant radiation therapy to the right chest wall.  7.  3/25/2013: Completed adjuvant radiation therapy to the left chest wall.  8.  6/2013: Started adjuvant letrozole.  This was interrupted between August and September 2013 due to side effects.      INTERIM HISTORY:  Janelle Kolb reports feeling overall well but has had some consistent hair thinning.  She recently had some digestive issues but those have resolved so she did not need any imaging work-up for that.  Otherwise, she denies any fevers, chills, night sweats, unintentional weight loss, or new bone pain.  She still has some arthralgias from the continued use of letrozole.      REVIEW OF SYSTEMS:   14 point ROS was reviewed and is negative other than as noted above in HPI.       HOME MEDICATIONS:  Current Outpatient Medications   Medication Sig Dispense Refill     Cholecalciferol (VITAMIN D) 1000 UNITS capsule Take 1 capsule by mouth daily 5, 000 iu       Coenzyme Q10 (CO Q 10 PO) Take 100 mg by mouth       ESTRADIOL 0.1MG/GM CREAM Insert 1 gram vaginally 2-3 times per week 30 g 6     ibuprofen (ADVIL,MOTRIN) 200  MG tablet Take 600 mg by mouth as needed.       letrozole (FEMARA) 2.5 MG tablet Take 1 tablet (2.5 mg) by mouth daily 90 tablet 3     metoprolol (LOPRESSOR) 50 MG tablet Take 50 mg by mouth daily.       order for DME Equipment being ordered: RUE velcro-strap compression garment, 30-40 mmHg, or quilted directional flow garment, 20-30 mmHg 1 each 0     triamterene-hydrochlorothiazide (MAXZIDE) 75-50 MG per tablet Take 1 tablet by mouth. Patient reports she has only been taking when she notices edema in her legs/ankles. Her prescription states to take 1 tablet po once daily.           ALLERGIES:  Allergies   Allergen Reactions     Compazine [Prochlorperazine] Other (See Comments)     Severe extra pyramidal side effects  (muscles spasms, shakiness)     Erythromycin GI Disturbance     Latex Difficulty breathing         PAST MEDICAL HISTORY:  Past Medical History:   Diagnosis Date     Hypertension      Malignant neoplasm (H) 1/23/2012    BILATERAL BREAST CANCER INTO LYMPH NODES   BRCA2 mutation testing in 2004 was negative.  Positive history of hormone replacement therapy, discontinued in 2008.  Kidney stones.  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      PAST SURGICAL HISTORY:  Past Surgical History:   Procedure Laterality Date     ABDOMEN SURGERY       APPENDECTOMY       BIOPSY       BREAST SURGERY       CHOLECYSTECTOMY       COLONOSCOPY       CYSTOSCOPY      STENTS IN PAST X 2, THEN REMOVED     GENITOURINARY SURGERY       GI SURGERY       GYN SURGERY       LITHOTRIPSY       ORTHOPEDIC SURGERY       SOFT TISSUE SURGERY           SOCIAL HISTORY:  Social History     Socioeconomic History     Marital status:      Spouse name: Not on file     Number of children: Not on file     Years of education: Not on file     Highest education level: Not on file   Occupational History     Not on file   Social Needs     Financial resource strain: Not on file     Food insecurity:     Worry: Not on file     Inability: Not  "on file     Transportation needs:     Medical: Not on file     Non-medical: Not on file   Tobacco Use     Smoking status: Never Smoker     Smokeless tobacco: Never Used   Substance and Sexual Activity     Alcohol use: Yes     Comment: occasional     Drug use: No     Sexual activity: Not on file   Lifestyle     Physical activity:     Days per week: Not on file     Minutes per session: Not on file     Stress: Not on file   Relationships     Social connections:     Talks on phone: Not on file     Gets together: Not on file     Attends Rastafari service: Not on file     Active member of club or organization: Not on file     Attends meetings of clubs or organizations: Not on file     Relationship status: Not on file     Intimate partner violence:     Fear of current or ex partner: Not on file     Emotionally abused: Not on file     Physically abused: Not on file     Forced sexual activity: Not on file   Other Topics Concern     Parent/sibling w/ CABG, MI or angioplasty before 65F 55M? 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      FAMILY HISTORY:  Family History   Problem Relation Age of Onset     Cancer Mother         ovarian,  at age 70     Hypertension Mother      C.A.D. Mother      Hypertension Father      Breast Cancer Other 59        cousin         PHYSICAL EXAM:  Vital signs:  BP (!) 142/79   Pulse 75   Resp 16   Ht 1.702 m (5' 7\")   Wt 117.9 kg (260 lb)   LMP 2008   SpO2 94%   BMI 40.72 kg/m      ECO  GENERAL/CONSTITUTIONAL: No acute distress.  EYES: Extraocular movements intact.  No scleral icterus.  ENT/MOUTH: Neck supple. Oropharynx clear, no mucositis.  LYMPH: No anterior cervical, posterior cervical, supraclavicular, axillary or inguinal adenopathy.   BREAST: Bilateral breasts are surgically absent with no palpable chest wall masses or fluid.  No erythema, rash, or ulcerations.  RESPIRATORY: Clear to auscultation " bilaterally. No crackles or wheezing.   CARDIOVASCULAR: Regular rate and rhythm without murmurs, gallops, or rubs.  GASTROINTESTINAL: No hepatosplenomegaly, masses, or tenderness. No guarding.  No distention.  MUSCULOSKELETAL: Warm and well-perfused, no cyanosis, clubbing, or edema.  NEUROLOGIC: Cranial nerves II-XII are intact. Alert, oriented, answers questions appropriately.  INTEGUMENTARY: Slightly scaly area at the central upper chest.  GAIT: Steady, does not use assistive device      LABS:  None.    PATHOLOGY:  None new.    IMAGING:  None new.    ASSESSMENT/PLAN:  Janelle Kolb is a 69 year old female with the following issues:  1.  Stage IIIA, right breast invasive lobular carcinoma of overlapping sites, grade 1, ER positive, IL positive, HER-2/olesya negative status post right mastectomy  2.  Stage II, left upper inner breast invasive ductal carcinoma, grade 2, ER positive, IL positive, HER-2/olesya negative, status post left mastectomy  3.  Morbid obesity  -I discussed with Janelle that she has no clinical evidence for recurrent breast cancer by physical exam.  She will continue on letrozole which she is tolerating well.  I recommended up to a total of 10 years of adjuvant endocrine therapy given her lymph node positive disease at time of diagnosis.  -Plan to repeat DEXA scan every 2 years, next due in 9/2020.  4.  Vitamin D deficiency  - Continue vitamin D supplementation.  She declines taking calcium supplementation due to history of kidney stones.  5.  Peripheral neuropathy, drug-induced  -This is related to past taxing use but mild in her hands and feet.  Continue observation.  6.  Atrophic vaginitis  -Stable.  Continue vaginal estrogen cream.  7.  Hair thinning  -She will follow-up with dermatology for different methods to help with her with her thinning.    Return in 6 months, then yearly thereafter.      Lana Aguilar MD  Hematology/Oncology  [facility]    I spent a total of  "25 minutes with the patient, with greater than 50% of the time in counseling and coordination of care.      Oncology Rooming Note    November 11, 2019 10:10 AM   Janelle Kolb is a 69 year old female who presents for:    Chief Complaint   Patient presents with     Oncology Clinic Visit     Initial Vitals: BP (!) 142/79   Pulse 75   Resp 16   Ht 1.702 m (5' 7\")   Wt 117.9 kg (260 lb)   LMP 03/06/2008   SpO2 94%   BMI 40.72 kg/m    Estimated body mass index is 40.72 kg/m  as calculated from the following:    Height as of this encounter: 1.702 m (5' 7\").    Weight as of this encounter: 117.9 kg (260 lb). Body surface area is 2.36 meters squared.  Moderate Pain (4) Comment: Data Unavailable   Patient's last menstrual period was 03/06/2008.  Allergies reviewed: Yes  Medications reviewed: Yes    Medications: Medication refills not needed today.  Pharmacy name entered into [facility]:    [id number] [address] [facility] [place] [address] [place] [address]    Clinical concerns: no      Amanda Givens, [facility]                Again, thank you for allowing me to participate in the care of your patient.        Sincerely,        Lana Aguilar MD    "

## 2019-11-11 NOTE — TELEPHONE ENCOUNTER
RECORDS STATUS - ALL OTHER DIAGNOSIS      RECORDS RECEIVED FROM: [facility]    DATE RECEIVED: 11/11/2019    NOTES STATUS DETAILS   OFFICE NOTE from referring provider complete [facility]   OFFICE NOTE from medical oncologist Complete [facility]    DISCHARGE SUMMARY from hospital N/A    DISCHARGE REPORT from the ER N/A    OPERATIVE REPORT N/A    MEDICATION LIST Complete [facility]   CLINICAL TRIAL TREATMENTS TO DATE     LABS     PATHOLOGY REPORTS     ANYTHING RELATED TO DIAGNOSIS     GENONOMIC TESTING     TYPE:     IMAGING (NEED IMAGES & REPORT)     CT SCANS     Xray Chest Complete PACS   MRI     MAMMO     ULTRASOUND Complete PACS   PET       Action    Action Taken 11/11/2019 9:42pm     I was assigned an ib-message on Friday at 4:45pm and already left for the day. I called [facility] and spoke to Amanda in the medical records dept. She requires a release form to fax over the pt's recent records. Fax the request to [phone].  I called over to the [facility] this morning to request a release form to be signed to get the pt's most recent records from [facility].     I called the [facility] Rm: [phone]

## 2019-11-11 NOTE — PROGRESS NOTES
"Oncology Rooming Note    November 11, 2019 10:10 AM   Janelle Kolb is a 69 year old female who presents for:    Chief Complaint   Patient presents with     Oncology Clinic Visit     Initial Vitals: BP (!) 142/79   Pulse 75   Resp 16   Ht 1.702 m (5' 7\")   Wt 117.9 kg (260 lb)   LMP 03/06/2008   SpO2 94%   BMI 40.72 kg/m   Estimated body mass index is 40.72 kg/m  as calculated from the following:    Height as of this encounter: 1.702 m (5' 7\").    Weight as of this encounter: 117.9 kg (260 lb). Body surface area is 2.36 meters squared.  Moderate Pain (4) Comment: Data Unavailable   Patient's last menstrual period was 03/06/2008.  Allergies reviewed: Yes  Medications reviewed: Yes    Medications: Medication refills not needed today.  Pharmacy name entered into [employer]:    [id number] [place] [facility], [place] - [address] [facility] [place], [place] - [address] [address]    Clinical concerns: no      Amanda Givens CMA              "

## 2019-11-22 ENCOUNTER — HOSPITAL ENCOUNTER (OUTPATIENT)
Dept: OCCUPATIONAL THERAPY | Facility: CLINIC | Age: 69
Setting detail: THERAPIES SERIES
End: 2019-11-22
Attending: INTERNAL MEDICINE
Payer: COMMERCIAL

## 2019-11-22 PROCEDURE — 97140 MANUAL THERAPY 1/> REGIONS: CPT | Mod: GO

## 2020-07-23 ENCOUNTER — TELEPHONE (OUTPATIENT)
Dept: ONCOLOGY | Facility: CLINIC | Age: 70
End: 2020-07-23

## 2020-07-23 NOTE — TELEPHONE ENCOUNTER
Patient called back in regards to scheduling follow appointment with Dr Aguilar, she states she would like to hold off scheduling due to COVID. She is not experiencing any symptoms at this time. Please call patient with questions.

## 2020-07-23 NOTE — TELEPHONE ENCOUNTER
Called Janelle bonilla, left message  and stated based on Dr. Aguilar's dictation that her visit can be delayed due to covid. Informed her to call clinic if she is experiencing any issues with her anastrazole. Message left that she should be seen sometime this Fall and that visit can be virtual. Rajni Malone RN,BSN,OCN

## 2020-07-30 ENCOUNTER — RESULTS ONLY (OUTPATIENT)
Dept: LAB | Age: 70
End: 2020-07-30

## 2020-07-30 ENCOUNTER — OFFICE VISIT (OUTPATIENT)
Dept: URGENT CARE | Facility: URGENT CARE | Age: 70
End: 2020-07-30
Payer: COMMERCIAL

## 2020-07-30 DIAGNOSIS — Z53.9 ERRONEOUS ENCOUNTER--DISREGARD: Primary | ICD-10-CM

## 2020-07-31 LAB
SARS-COV-2 RNA SPEC QL NAA+PROBE: NOT DETECTED
SPECIMEN SOURCE: NORMAL

## 2020-09-21 NOTE — PROGRESS NOTES
20 1000   Signing Clinician's Name / Credentials   Signing clinician's name / credentials Walker Allan, OTR/L, CLT   Session Number   Session Number DISCHARGE NOTE   Comments   Comments Patient has not been seen in clinic since 2019 2/2 therapist BELEN and pandemic, current order has .  Unable to determine final progress toward goals, LLIS score.  Discharge edema treatment.

## 2020-12-04 DIAGNOSIS — C50.212 MALIGNANT NEOPLASM OF UPPER-INNER QUADRANT OF LEFT BREAST IN FEMALE, ESTROGEN RECEPTOR POSITIVE (H): Primary | ICD-10-CM

## 2020-12-04 DIAGNOSIS — Z17.0 MALIGNANT NEOPLASM OF UPPER-INNER QUADRANT OF LEFT BREAST IN FEMALE, ESTROGEN RECEPTOR POSITIVE (H): Primary | ICD-10-CM

## 2020-12-04 DIAGNOSIS — C50.919 MALIGNANT NEOPLASM OF BREAST (H): ICD-10-CM

## 2020-12-04 RX ORDER — LETROZOLE 2.5 MG/1
2.5 TABLET, FILM COATED ORAL DAILY
Qty: 90 TABLET | Refills: 3 | Status: SHIPPED | OUTPATIENT
Start: 2020-12-04 | End: 2021-12-29

## 2020-12-04 NOTE — TELEPHONE ENCOUNTER
[facility] Telephone Note    Caller: Patient     Question/Concern: Patient calling because she needs a refill on her letrozole. She reports her pharmacy contacted the clinic about a month ago but it never got refilled, and she will be taking her last pill tonight. Requesting a refill ASAP.    Follow-Up: Encounter routed to RNCC and MD for review and recommendations.

## 2021-01-12 ENCOUNTER — TELEPHONE (OUTPATIENT)
Dept: ONCOLOGY | Facility: CLINIC | Age: 71
End: 2021-01-12

## 2021-01-12 NOTE — TELEPHONE ENCOUNTER
Left message on Janelle's voice mail that her visit with Dr. Aguilar tomorrow will need to be virtual. Requested call back from Janelle to verify that message was received. Rajni Malone RN,BSN,OCN

## 2021-01-13 ENCOUNTER — TELEPHONE (OUTPATIENT)
Dept: ONCOLOGY | Facility: CLINIC | Age: 71
End: 2021-01-13

## 2021-02-05 ENCOUNTER — TELEPHONE (OUTPATIENT)
Dept: ONCOLOGY | Facility: CLINIC | Age: 71
End: 2021-02-05

## 2021-02-09 ENCOUNTER — TELEPHONE (OUTPATIENT)
Dept: ONCOLOGY | Facility: CLINIC | Age: 71
End: 2021-02-09

## 2021-03-11 NOTE — PROGRESS NOTES
"[facility]    Hematology/Oncology Established Patient Follow-up Note      Today's Date: 3/18/2021    Reason for Follow-up: Bilateral breast cancer, status post bilateral mastectomies.    Janelle Kolb is a 70 year old female who is being evaluated via a billable telephone visit.      The patient has been notified of following:     \"This telephone visit will be conducted via a call between you and your physician/provider. We have found that certain health care needs can be provided without the need for a physical exam.  This service lets us provide the care you need with a short phone conversation during the COVID-19 pandemic.  If a prescription is necessary we can send it directly to your pharmacy.  If lab work is needed we can place an order for that and you can then stop by our lab to have the test done at a later time.    Telephone visits are billed at different rates depending on your insurance coverage. During this emergency period, for some insurers they may be billed the same as an in-person visit.  Please reach out to your insurance provider with any questions.    If during the course of the call the physician/provider feels a telephone visit is not appropriate, you will not be charged for this service.\"    Patient has given verbal consent for Telephone visit?  Yes    How would you like to obtain your AVS? Mail a copy    Phone visit duration: 14 minutes    HISTORY OF PRESENT ILLNESS: Janelle Kolb is a 70 year old female who presents with the following oncologic history:  1.  12/03/2011: Biopsy of right breast at 9:00 showed proliferative changes including fibrosis, adenosis, apocrine metaplasia, and ductal hyperplasia without atypia, and associated microcalcifications with no evidence of malignancy.  2.  1/04/2012: Left upper inner breast biopsy showed atypical ductal hyperplasia and atypical lobular hyperplasia with associated calcifications.  3.  1/19/2012: Underwent " lumpectomies at [facility] on the bilateral breasts.  Pathology showed a 0.5 x 0.4 x 0.4 cm left upper inner breast well-differentiated invasive ductal carcinoma, grade 1 with associated DCIS, lymphovascular invasion absent.  ER was positive at 75%, NV weakly positive at 3%, HER-2/olesya negative.  Right breast showed grade 1 invasive lobular carcinoma at 9:00, measuring 4 x 2 x 2 cm, multifocal with margins multifocally positive for invasive carcinoma; lymphovascular invasion present.  No lymph nodes were removed.  4.  3/07/2012: Underwent bilateral mastectomies with axillary lymph node dissection.  Pathology showed grade 2, left breast invasive mammary carcinoma with ductal differentiation measuring 3 mm and multifocal LCIS and focal DCIS; lymphovascular invasion not identified; 2/5 axillary lymph nodes involved with size of largest metastatic deposit 9.5 mm (later felt to be an intramammary lymph node); extranodal extension not identified.  Left-sided lymph node was ER positive at 75%, NV positive at 50%, HER-2/olesya negative by IHC.  Therefore, left breast [id number].  Pathology showed multifocal right breast invasive lobular carcinoma of overlapping sites.  Right breast showed the multiple foci measuring up to 2.3 cm, invasive ductal carcinoma, grade 1 with 10/19 lymph nodes involved (8 with macro metastases and 2 with micrometastasis), with largest metastatic tumor deposit measuring 1.3 cm, extranodal extension not identified.  ER was positive at 97%, NV positive at 91%, HER-2/olesya negative with IHC = 0+.  Therefore, right breast pT2-pN3a.  5.  4/23/2012-10/01/2012: Received adjuvant chemotherapy with 4 cycles of dose dense Adriamycin and Cytoxan followed by 1 dose of Taxol and 3 cycles of Abraxane, under the care of Dr. Cassy Germain.  6.  12/27/2012: Completed adjuvant radiation therapy to the right chest wall.  7.  3/25/2013: Completed adjuvant radiation therapy to the left chest wall.  8.  6/2013: Started  adjuvant letrozole.  This was interrupted between August and September 2013 due to side effects.      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  She still has aching from letrozole but is no longer taking ibuprofen and just tolerating it.    REVIEW OF SYSTEMS:   14 point ROS was reviewed and is negative other than as noted above in HPI.       HOME MEDICATIONS:  Current Outpatient Medications   Medication Sig Dispense Refill     Biotin 5 MG TBDP Take 5,000 mcg by mouth       Cholecalciferol (VITAMIN D) 1000 UNITS capsule Take 1 capsule by mouth daily 5, 000 iu       Coenzyme Q10 (CO Q 10 PO) Take 100 mg by mouth       ESTRADIOL 0.1MG/GM CREAM Insert 1 gram vaginally 2-3 times per week (Patient not taking: Reported on 1/13/2021) 30 g 6     hydrochlorothiazide (HYDRODIURIL) 25 MG tablet Take 25 mg by mouth       ibuprofen (ADVIL,MOTRIN) 200 MG tablet Take 600 mg by mouth as needed.       letrozole (FEMARA) 2.5 MG tablet Take 1 tablet (2.5 mg) by mouth daily 90 tablet 3     losartan (COZAAR) 100 MG tablet Take 100 mg by mouth       metFORMIN (GLUCOPHAGE-XR) 500 MG 24 hr tablet TAKE 1 TABLET BY MOUTH ONCE DAILY WITH EVENING MEAL.       metoprolol (LOPRESSOR) 50 MG tablet Take 50 mg by mouth daily.       sertraline (ZOLOFT) 50 MG tablet Take 75 mg by mouth       triamterene-hydrochlorothiazide (MAXZIDE) 75-50 MG per tablet Take 1 tablet by mouth. Patient reports she has only been taking when she notices edema in her legs/ankles. Her prescription states to take 1 tablet po once daily.           ALLERGIES:  Allergies   Allergen Reactions     Compazine [Prochlorperazine] Other (See Comments)     Severe extra pyramidal side effects  (muscles spasms, shakiness)     Erythromycin GI Disturbance     Latex Difficulty breathing         PAST MEDICAL HISTORY:  Past Medical History:   Diagnosis Date     Hypertension      Malignant neoplasm (H) 1/23/2012     BILATERAL BREAST CANCER INTO LYMPH NODES   BRCA2 mutation testing in 2004 was negative.  Positive history of hormone replacement therapy, discontinued in 2008.  Kidney stones.  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      PAST SURGICAL HISTORY:  Past Surgical History:   Procedure Laterality Date     ABDOMEN SURGERY       APPENDECTOMY       BIOPSY       BREAST SURGERY       CHOLECYSTECTOMY       COLONOSCOPY       CYSTOSCOPY      STENTS IN PAST X 2, THEN REMOVED     GENITOURINARY SURGERY       GI SURGERY       GYN SURGERY       LITHOTRIPSY       ORTHOPEDIC SURGERY       SOFT TISSUE SURGERY           SOCIAL HISTORY:  Social History     Socioeconomic History     Marital status:      Spouse name: Not on file     Number of children: Not on file     Years of education: Not on file     Highest education level: Not on file   Occupational History     Not on file   Social Needs     Financial resource strain: Not on file     Food insecurity     Worry: Not on file     Inability: Not on file     Transportation needs     Medical: Not on file     Non-medical: Not on file   Tobacco Use     Smoking status: Never Smoker     Smokeless tobacco: Never Used   Substance and Sexual Activity     Alcohol use: Yes     Comment: occasional     Drug use: No     Sexual activity: Not on file   Lifestyle     Physical activity     Days per week: Not on file     Minutes per session: Not on file     Stress: Not on file   Relationships     Social connections     Talks on phone: Not on file     Gets together: Not on file     Attends [employer] service: Not on file     Active member of club or organization: Not on file     Attends meetings of clubs or organizations: Not on file     Relationship status: Not on file     Intimate partner violence     Fear of current or ex partner: Not on file     Emotionally abused: Not on file     Physically abused: Not on file     Forced sexual activity: Not on file   Other Topics Concern     Parent/sibling  w/ CABG, MI or angioplasty before 65F 55M? 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      FAMILY HISTORY:  Family History   Problem Relation Age of Onset     Cancer Mother         ovarian,  at age 70     Hypertension Mother      C.A.D. Mother      Hypertension Father      Breast Cancer Other 59        cousin         PHYSICAL EXAM:  Vital signs:  Not taken.   Not performed as this was a telephone visit.    LABS:  None.    PATHOLOGY:  None new.    IMAGING:  None new.    ASSESSMENT/PLAN:  Janelle Kolb is a 70 year old female with the following issues:  1.  Stage IIIA, right breast invasive lobular carcinoma of overlapping sites, grade 1, ER positive, CT positive, HER-2/olesya negative status post right mastectomy  2.  Stage II, left upper inner breast invasive ductal carcinoma, grade 2, ER positive, CT positive, HER-2/olesya negative, status post left mastectomy  3.  Morbid obesity  4. Borderline osteopenia  -I discussed with Janelle that she has no clinical evidence for recurrent breast cancer by current clinical history.  She will continue on letrozole which she is tolerating well.  I recommended up to a total of 10 years of adjuvant endocrine therapy given her lymph node positive disease at time of diagnosis. Anticipated completion 2023.  -Plan to repeat DEXA scan every 2 years, overdue since 2020 -- will arrange and notify results.  5.  Vitamin D deficiency  - Continue vitamin D supplementation.  She declines taking calcium supplementation due to history of kidney stones.  6.  Peripheral neuropathy, drug-induced  -This is related to past taxane use but mild in her hands and feet.  Continue observation.  7.  Atrophic vaginitis  -Stable.  Continue vaginal estrogen cream.    Return in 6 months.    Lana Aguilar MD  Hematology/Oncology  [facility]

## 2021-03-18 ENCOUNTER — VIRTUAL VISIT (OUTPATIENT)
Dept: ONCOLOGY | Facility: CLINIC | Age: 71
End: 2021-03-18
Attending: INTERNAL MEDICINE
Payer: COMMERCIAL

## 2021-03-18 DIAGNOSIS — Z17.0 MALIGNANT NEOPLASM OF OVERLAPPING SITES OF RIGHT BREAST IN FEMALE, ESTROGEN RECEPTOR POSITIVE (H): Primary | ICD-10-CM

## 2021-03-18 DIAGNOSIS — N95.2 ATROPHIC VAGINITIS: ICD-10-CM

## 2021-03-18 DIAGNOSIS — G62.0 DRUG-INDUCED POLYNEUROPATHY (H): ICD-10-CM

## 2021-03-18 DIAGNOSIS — C50.811 MALIGNANT NEOPLASM OF OVERLAPPING SITES OF RIGHT BREAST IN FEMALE, ESTROGEN RECEPTOR POSITIVE (H): Primary | ICD-10-CM

## 2021-03-18 DIAGNOSIS — M85.852 OSTEOPENIA OF LEFT HIP: ICD-10-CM

## 2021-03-18 DIAGNOSIS — C50.212 MALIGNANT NEOPLASM OF UPPER-INNER QUADRANT OF LEFT BREAST IN FEMALE, ESTROGEN RECEPTOR POSITIVE (H): ICD-10-CM

## 2021-03-18 DIAGNOSIS — Z17.0 MALIGNANT NEOPLASM OF UPPER-INNER QUADRANT OF LEFT BREAST IN FEMALE, ESTROGEN RECEPTOR POSITIVE (H): ICD-10-CM

## 2021-03-18 PROCEDURE — 999N001193 HC VIDEO/TELEPHONE VISIT; NO CHARGE

## 2021-03-18 PROCEDURE — 99213 OFFICE O/P EST LOW 20 MIN: CPT | Mod: GT | Performed by: INTERNAL MEDICINE

## 2021-03-18 ASSESSMENT — PAIN SCALES - GENERAL: PAINLEVEL: MILD PAIN (3)

## 2021-03-18 NOTE — LETTER
"    3/18/2021         RE: Janelle Kolb  [address] [facility] [address]        Dear Colleague,    Thank you for referring your patient, Janelle Kolb, to the [facility] CANCER [facility]. Please see a copy of my visit note below.    [facility], [place]    Hematology/Oncology Established Patient Follow-up Note      Today's Date: 3/18/2021    Reason for Follow-up: Bilateral breast cancer, status post bilateral mastectomies.    Janelle Kolb is a 70 year old female who is being evaluated via a billable telephone visit.      The patient has been notified of following:     \"This telephone visit will be conducted via a call between you and your physician/provider. We have found that certain health care needs can be provided without the need for a physical exam.  This service lets us provide the care you need with a short phone conversation during the COVID-19 pandemic.  If a prescription is necessary we can send it directly to your pharmacy.  If lab work is needed we can place an order for that and you can then stop by our lab to have the test done at a later time.    Telephone visits are billed at different rates depending on your insurance coverage. During this emergency period, for some insurers they may be billed the same as an in-person visit.  Please reach out to your insurance provider with any questions.    If during the course of the call the physician/provider feels a telephone visit is not appropriate, you will not be charged for this service.\"    Patient has given verbal consent for Telephone visit?  Yes    How would you like to obtain your AVS? Mail a copy    Phone visit duration: 14 minutes    HISTORY OF PRESENT ILLNESS: Janelle Kolb is a 70 year old female who presents with the following oncologic history:  1.  12/03/2011: Biopsy of right breast at 9:00 showed proliferative changes including fibrosis, adenosis, apocrine metaplasia, and " ductal hyperplasia without atypia, and associated microcalcifications with no evidence of malignancy.  2.  1/04/2012: Left upper inner breast biopsy showed atypical ductal hyperplasia and atypical lobular hyperplasia with associated calcifications.  3.  1/19/2012: Underwent lumpectomies at [facility] on the bilateral breasts.  Pathology showed a 0.5 x 0.4 x 0.4 cm left upper inner breast well-differentiated invasive ductal carcinoma, grade 1 with associated DCIS, lymphovascular invasion absent.  ER was positive at 75%, MS weakly positive at 3%, HER-2/olesya negative.  Right breast showed grade 1 invasive lobular carcinoma at 9:00, measuring 4 x 2 x 2 cm, multifocal with margins multifocally positive for invasive carcinoma; lymphovascular invasion present.  No lymph nodes were removed.  4.  3/07/2012: Underwent bilateral mastectomies with axillary lymph node dissection.  Pathology showed grade 2, left breast invasive mammary carcinoma with ductal differentiation measuring 3 mm and multifocal LCIS and focal DCIS; lymphovascular invasion not identified; 2/5 axillary lymph nodes involved with size of largest metastatic deposit 9.5 mm (later felt to be an intramammary lymph node); extranodal extension not identified.  Left-sided lymph node was ER positive at 75%, MS positive at 50%, HER-2/olesya negative by IHC.  Therefore, left breast [id number].  Pathology showed multifocal right breast invasive lobular carcinoma of overlapping sites.  Right breast showed the multiple foci measuring up to 2.3 cm, invasive ductal carcinoma, grade 1 with 10/19 lymph nodes involved (8 with macro metastases and 2 with micrometastasis), with largest metastatic tumor deposit measuring 1.3 cm, extranodal extension not identified.  ER was positive at 97%, MS positive at 91%, HER-2/olesya negative with IHC = 0+.  Therefore, right breast pT2-pN3a.  5.  4/23/2012-10/01/2012: Received adjuvant chemotherapy with 4 cycles of dose dense Adriamycin and  Cytoxan followed by 1 dose of Taxol and 3 cycles of Abraxane, under the care of Dr. Cassy Germain.  6.  12/27/2012: Completed adjuvant radiation therapy to the right chest wall.  7.  3/25/2013: Completed adjuvant radiation therapy to the left chest wall.  8.  6/2013: Started adjuvant letrozole.  This was interrupted between August and September 2013 due to side effects.      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  She still has aching from letrozole but is no longer taking ibuprofen and just tolerating it.    REVIEW OF SYSTEMS:   14 point ROS was reviewed and is negative other than as noted above in HPI.       HOME MEDICATIONS:  Current Outpatient Medications   Medication Sig Dispense Refill     Biotin 5 MG TBDP Take 5,000 mcg by mouth       Cholecalciferol (VITAMIN D) 1000 UNITS capsule Take 1 capsule by mouth daily 5, 000 iu       Coenzyme Q10 (CO Q 10 PO) Take 100 mg by mouth       ESTRADIOL 0.1MG/GM CREAM Insert 1 gram vaginally 2-3 times per week (Patient not taking: Reported on 1/13/2021) 30 g 6     hydrochlorothiazide (HYDRODIURIL) 25 MG tablet Take 25 mg by mouth       ibuprofen (ADVIL,MOTRIN) 200 MG tablet Take 600 mg by mouth as needed.       letrozole (FEMARA) 2.5 MG tablet Take 1 tablet (2.5 mg) by mouth daily 90 tablet 3     losartan (COZAAR) 100 MG tablet Take 100 mg by mouth       metFORMIN (GLUCOPHAGE-XR) 500 MG 24 hr tablet TAKE 1 TABLET BY MOUTH ONCE DAILY WITH EVENING MEAL.       metoprolol (LOPRESSOR) 50 MG tablet Take 50 mg by mouth daily.       sertraline (ZOLOFT) 50 MG tablet Take 75 mg by mouth       triamterene-hydrochlorothiazide (MAXZIDE) 75-50 MG per tablet Take 1 tablet by mouth. Patient reports she has only been taking when she notices edema in her legs/ankles. Her prescription states to take 1 tablet po once daily.           ALLERGIES:  Allergies   Allergen Reactions     Compazine [Prochlorperazine] Other (See  Comments)     Severe extra pyramidal side effects  (muscles spasms, shakiness)     Erythromycin GI Disturbance     Latex Difficulty breathing         PAST MEDICAL HISTORY:  Past Medical History:   Diagnosis Date     Hypertension      Malignant neoplasm (H) 1/23/2012    BILATERAL BREAST CANCER INTO LYMPH NODES   BRCA2 mutation testing in 2004 was negative.  Positive history of hormone replacement therapy, discontinued in 2008.  Kidney stones.  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      PAST SURGICAL HISTORY:  Past Surgical History:   Procedure Laterality Date     ABDOMEN SURGERY       APPENDECTOMY       BIOPSY       BREAST SURGERY       CHOLECYSTECTOMY       COLONOSCOPY       CYSTOSCOPY      STENTS IN PAST X 2, THEN REMOVED     GENITOURINARY SURGERY       GI SURGERY       GYN SURGERY       LITHOTRIPSY       ORTHOPEDIC SURGERY       SOFT TISSUE SURGERY           SOCIAL HISTORY:  Social History     Socioeconomic History     Marital status:      Spouse name: Not on file     Number of children: Not on file     Years of education: Not on file     Highest education level: Not on file   Occupational History     Not on file   Social Needs     Financial resource strain: Not on file     Food insecurity     Worry: Not on file     Inability: Not on file     Transportation needs     Medical: Not on file     Non-medical: Not on file   Tobacco Use     Smoking status: Never Smoker     Smokeless tobacco: Never Used   Substance and Sexual Activity     Alcohol use: Yes     Comment: occasional     Drug use: No     Sexual activity: Not on file   Lifestyle     Physical activity     Days per week: Not on file     Minutes per session: Not on file     Stress: Not on file   Relationships     Social connections     Talks on phone: Not on file     Gets together: Not on file     Attends Jain service: Not on file     Active member of club or organization: Not on file     Attends meetings of clubs or organizations: Not on  file     Relationship status: Not on file     Intimate partner violence     Fear of current or ex partner: Not on file     Emotionally abused: Not on file     Physically abused: Not on file     Forced sexual activity: Not on file   Other Topics Concern     Parent/sibling w/ CABG, MI or angioplasty before 65F 55M? 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      FAMILY HISTORY:  Family History   Problem Relation Age of Onset     Cancer Mother         ovarian,  at age 70     Hypertension Mother      C.A.D. Mother      Hypertension Father      Breast Cancer Other 59        cousin         PHYSICAL EXAM:  Vital signs:  Not taken.   Not performed as this was a telephone visit.    LABS:  None.    PATHOLOGY:  None new.    IMAGING:  None new.    ASSESSMENT/PLAN:  Janelle Kolb is a 70 year old female with the following issues:  1.  Stage IIIA, right breast invasive lobular carcinoma of overlapping sites, grade 1, ER positive, CO positive, HER-2/olesya negative status post right mastectomy  2.  Stage II, left upper inner breast invasive ductal carcinoma, grade 2, ER positive, CO positive, HER-2/olesya negative, status post left mastectomy  3.  Morbid obesity  4. Borderline osteopenia  -I discussed with Janelle that she has no clinical evidence for recurrent breast cancer by current clinical history.  She will continue on letrozole which she is tolerating well.  I recommended up to a total of 10 years of adjuvant endocrine therapy given her lymph node positive disease at time of diagnosis. Anticipated completion 2023.  -Plan to repeat DEXA scan every 2 years, overdue since 2020 -- will arrange and notify results.  5.  Vitamin D deficiency  - Continue vitamin D supplementation.  She declines taking calcium supplementation due to history of kidney stones.  6.  Peripheral neuropathy, drug-induced  -This is related to past taxane use but mild in her hands and  feet.  Continue observation.  7.  Atrophic vaginitis  -Stable.  Continue vaginal estrogen cream.    Return in 6 months.    Lana Aguilar MD  Hematology/Oncology  [facility]      Janelle is a 70 year old who is being evaluated via a billable telephone visit.      What phone number would you like to be contacted at? [phone]    How would you like to obtain your AVS? Mail a copy  Phone call duration: 5 minutes        Again, thank you for allowing me to participate in the care of your patient.        Sincerely,        Lana Aguilar MD

## 2021-03-18 NOTE — PROGRESS NOTES
Janelle is a 70 year old who is being evaluated via a billable telephone visit.      What phone number would you like to be contacted at? [phone]    How would you like to obtain your AVS? Mail a copy  Phone call duration: 5 minutes

## 2021-03-18 NOTE — LETTER
"    3/18/2021         RE: Janelle Kolb  [address] [facility] [address]        Dear Colleague,    Thank you for referring your patient, Janelle Kolb, to the [facility]CER [facility]. Please see a copy of my visit note below.    [facility], [place]    Hematology/Oncology Established Patient Follow-up Note      Today's Date: 3/18/2021    Reason for Follow-up: Bilateral breast cancer, status post bilateral mastectomies.    Janelle Kolb is a 70 year old female who is being evaluated via a billable telephone visit.      The patient has been notified of following:     \"This telephone visit will be conducted via a call between you and your physician/provider. We have found that certain health care needs can be provided without the need for a physical exam.  This service lets us provide the care you need with a short phone conversation during the COVID-19 pandemic.  If a prescription is necessary we can send it directly to your pharmacy.  If lab work is needed we can place an order for that and you can then stop by our lab to have the test done at a later time.    Telephone visits are billed at different rates depending on your insurance coverage. During this emergency period, for some insurers they may be billed the same as an in-person visit.  Please reach out to your insurance provider with any questions.    If during the course of the call the physician/provider feels a telephone visit is not appropriate, you will not be charged for this service.\"    Patient has given verbal consent for Telephone visit?  Yes    How would you like to obtain your AVS? Mail a copy    Phone visit duration: 14 minutes    HISTORY OF PRESENT ILLNESS: Janelle Kolb is a 70 year old female who presents with the following oncologic history:  1.  12/03/2011: Biopsy of right breast at 9:00 showed proliferative changes including fibrosis, adenosis, apocrine metaplasia, and " ductal hyperplasia without atypia, and associated microcalcifications with no evidence of malignancy.  2.  1/04/2012: Left upper inner breast biopsy showed atypical ductal hyperplasia and atypical lobular hyperplasia with associated calcifications.  3.  1/19/2012: Underwent lumpectomies at [facility] on the bilateral breasts.  Pathology showed a 0.5 x 0.4 x 0.4 cm left upper inner breast well-differentiated invasive ductal carcinoma, grade 1 with associated DCIS, lymphovascular invasion absent.  ER was positive at 75%, MD weakly positive at 3%, HER-2/olesya negative.  Right breast showed grade 1 invasive lobular carcinoma at 9:00, measuring 4 x 2 x 2 cm, multifocal with margins multifocally positive for invasive carcinoma; lymphovascular invasion present.  No lymph nodes were removed.  4.  3/07/2012: Underwent bilateral mastectomies with axillary lymph node dissection.  Pathology showed grade 2, left breast invasive mammary carcinoma with ductal differentiation measuring 3 mm and multifocal LCIS and focal DCIS; lymphovascular invasion not identified; 2/5 axillary lymph nodes involved with size of largest metastatic deposit 9.5 mm (later felt to be an intramammary lymph node); extranodal extension not identified.  Left-sided lymph node was ER positive at 75%, MD positive at 50%, HER-2/olesya negative by IHC.  Therefore, left breast [id number].  Pathology showed multifocal right breast invasive lobular carcinoma of overlapping sites.  Right breast showed the multiple foci measuring up to 2.3 cm, invasive ductal carcinoma, grade 1 with 10/19 lymph nodes involved (8 with macro metastases and 2 with micrometastasis), with largest metastatic tumor deposit measuring 1.3 cm, extranodal extension not identified.  ER was positive at 97%, MD positive at 91%, HER-2/olesya negative with IHC = 0+.  Therefore, right breast pT2-pN3a.  5.  4/23/2012-10/01/2012: Received adjuvant chemotherapy with 4 cycles of dose dense Adriamycin and  Cytoxan followed by 1 dose of Taxol and 3 cycles of Abraxane, under the care of Dr. Cassy Germain.  6.  12/27/2012: Completed adjuvant radiation therapy to the right chest wall.  7.  3/25/2013: Completed adjuvant radiation therapy to the left chest wall.  8.  6/2013: Started adjuvant letrozole.  This was interrupted between August and September 2013 due to side effects.      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  She still has aching from letrozole but is no longer taking ibuprofen and just tolerating it.    REVIEW OF SYSTEMS:   14 point ROS was reviewed and is negative other than as noted above in HPI.       HOME MEDICATIONS:  Current Outpatient Medications   Medication Sig Dispense Refill     Biotin 5 MG TBDP Take 5,000 mcg by mouth       Cholecalciferol (VITAMIN D) 1000 UNITS capsule Take 1 capsule by mouth daily 5, 000 iu       Coenzyme Q10 (CO Q 10 PO) Take 100 mg by mouth       ESTRADIOL 0.1MG/GM CREAM Insert 1 gram vaginally 2-3 times per week (Patient not taking: Reported on 1/13/2021) 30 g 6     hydrochlorothiazide (HYDRODIURIL) 25 MG tablet Take 25 mg by mouth       ibuprofen (ADVIL,MOTRIN) 200 MG tablet Take 600 mg by mouth as needed.       letrozole (FEMARA) 2.5 MG tablet Take 1 tablet (2.5 mg) by mouth daily 90 tablet 3     losartan (COZAAR) 100 MG tablet Take 100 mg by mouth       metFORMIN (GLUCOPHAGE-XR) 500 MG 24 hr tablet TAKE 1 TABLET BY MOUTH ONCE DAILY WITH EVENING MEAL.       metoprolol (LOPRESSOR) 50 MG tablet Take 50 mg by mouth daily.       sertraline (ZOLOFT) 50 MG tablet Take 75 mg by mouth       triamterene-hydrochlorothiazide (MAXZIDE) 75-50 MG per tablet Take 1 tablet by mouth. Patient reports she has only been taking when she notices edema in her legs/ankles. Her prescription states to take 1 tablet po once daily.           ALLERGIES:  Allergies   Allergen Reactions     Compazine [Prochlorperazine] Other (See  Comments)     Severe extra pyramidal side effects  (muscles spasms, shakiness)     Erythromycin GI Disturbance     Latex Difficulty breathing         PAST MEDICAL HISTORY:  Past Medical History:   Diagnosis Date     Hypertension      Malignant neoplasm (H) 1/23/2012    BILATERAL BREAST CANCER INTO LYMPH NODES   BRCA2 mutation testing in 2004 was negative.  Positive history of hormone replacement therapy, discontinued in 2008.  Kidney stones.  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      PAST SURGICAL HISTORY:  Past Surgical History:   Procedure Laterality Date     ABDOMEN SURGERY       APPENDECTOMY       BIOPSY       BREAST SURGERY       CHOLECYSTECTOMY       COLONOSCOPY       CYSTOSCOPY      STENTS IN PAST X 2, THEN REMOVED     GENITOURINARY SURGERY       GI SURGERY       GYN SURGERY       LITHOTRIPSY       ORTHOPEDIC SURGERY       SOFT TISSUE SURGERY           SOCIAL HISTORY:  Social History     Socioeconomic History     Marital status:      Spouse name: Not on file     Number of children: Not on file     Years of education: Not on file     Highest education level: Not on file   Occupational History     Not on file   Social Needs     Financial resource strain: Not on file     Food insecurity     Worry: Not on file     Inability: Not on file     Transportation needs     Medical: Not on file     Non-medical: Not on file   Tobacco Use     Smoking status: Never Smoker     Smokeless tobacco: Never Used   Substance and Sexual Activity     Alcohol use: Yes     Comment: occasional     Drug use: No     Sexual activity: Not on file   Lifestyle     Physical activity     Days per week: Not on file     Minutes per session: Not on file     Stress: Not on file   Relationships     Social connections     Talks on phone: Not on file     Gets together: Not on file     Attends Anabaptism service: Not on file     Active member of club or organization: Not on file     Attends meetings of clubs or organizations: Not on  file     Relationship status: Not on file     Intimate partner violence     Fear of current or ex partner: Not on file     Emotionally abused: Not on file     Physically abused: Not on file     Forced sexual activity: Not on file   Other Topics Concern     Parent/sibling w/ CABG, MI or angioplasty before 65F 55M? 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      FAMILY HISTORY:  Family History   Problem Relation Age of Onset     Cancer Mother         ovarian,  at age 70     Hypertension Mother      C.A.D. Mother      Hypertension Father      Breast Cancer Other 59        cousin         PHYSICAL EXAM:  Vital signs:  Not taken.   Not performed as this was a telephone visit.    LABS:  None.    PATHOLOGY:  None new.    IMAGING:  None new.    ASSESSMENT/PLAN:  Janelle Kolb is a 70 year old female with the following issues:  1.  Stage IIIA, right breast invasive lobular carcinoma of overlapping sites, grade 1, ER positive, SD positive, HER-2/olesya negative status post right mastectomy  2.  Stage II, left upper inner breast invasive ductal carcinoma, grade 2, ER positive, SD positive, HER-2/olesya negative, status post left mastectomy  3.  Morbid obesity  4. Borderline osteopenia  -I discussed with Janelle that she has no clinical evidence for recurrent breast cancer by current clinical history.  She will continue on letrozole which she is tolerating well.  I recommended up to a total of 10 years of adjuvant endocrine therapy given her lymph node positive disease at time of diagnosis. Anticipated completion 2023.  -Plan to repeat DEXA scan every 2 years, overdue since 2020 -- will arrange and notify results.  5.  Vitamin D deficiency  - Continue vitamin D supplementation.  She declines taking calcium supplementation due to history of kidney stones.  6.  Peripheral neuropathy, drug-induced  -This is related to past taxane use but mild in her hands and  feet.  Continue observation.  7.  Atrophic vaginitis  -Stable.  Continue vaginal estrogen cream.    Return in 6 months.    Lana Aguilar MD  Hematology/Oncology  [facility]      Janelle is a 70 year old who is being evaluated via a billable telephone visit.      What phone number would you like to be contacted at? [phone]    How would you like to obtain your AVS? Mail a copy  Phone call duration: 5 minutes        Again, thank you for allowing me to participate in the care of your patient.        Sincerely,        Lana Aguilar MD

## 2021-03-23 ENCOUNTER — TELEPHONE (OUTPATIENT)
Dept: ONCOLOGY | Facility: CLINIC | Age: 71
End: 2021-03-23

## 2021-03-23 NOTE — PROGRESS NOTES
Received positive distress screen regarding patient's concern for current weight.  Called and left RD contact information on patient's voicemail.  Await return call from patient.    Jerri Zaman, RD, LD  Clinical Dietitian  [facility]  [phone] (direct)

## 2021-05-25 ENCOUNTER — RECORDS - HEALTHEAST (OUTPATIENT)
Dept: ADMINISTRATIVE | Facility: CLINIC | Age: 71
End: 2021-05-25

## 2021-05-26 ENCOUNTER — RECORDS - HEALTHEAST (OUTPATIENT)
Dept: ADMINISTRATIVE | Facility: CLINIC | Age: 71
End: 2021-05-26

## 2021-05-27 ENCOUNTER — RECORDS - HEALTHEAST (OUTPATIENT)
Dept: ADMINISTRATIVE | Facility: CLINIC | Age: 71
End: 2021-05-27

## 2021-05-28 ENCOUNTER — RECORDS - HEALTHEAST (OUTPATIENT)
Dept: ADMINISTRATIVE | Facility: CLINIC | Age: 71
End: 2021-05-28

## 2021-05-29 ENCOUNTER — RECORDS - HEALTHEAST (OUTPATIENT)
Dept: ADMINISTRATIVE | Facility: CLINIC | Age: 71
End: 2021-05-29

## 2021-05-30 VITALS — HEIGHT: 67 IN | WEIGHT: 258 LBS | BODY MASS INDEX: 40.49 KG/M2

## 2021-06-02 ENCOUNTER — RECORDS - HEALTHEAST (OUTPATIENT)
Dept: ADMINISTRATIVE | Facility: CLINIC | Age: 71
End: 2021-06-02

## 2021-06-03 ENCOUNTER — RECORDS - HEALTHEAST (OUTPATIENT)
Dept: ADMINISTRATIVE | Facility: CLINIC | Age: 71
End: 2021-06-03

## 2021-06-08 NOTE — ANESTHESIA POSTPROCEDURE EVALUATION
Patient: Janelle Lackey  #3  LAPAROSCOPIC CHOLECYSTECTOMY  Anesthesia type: general    Patient location: PACU  Last vitals:   Vitals:    01/03/17 1415   BP: 137/67   Pulse: 65   Resp: 18   Temp:    SpO2: 97%     Post vital signs: stable  Level of consciousness: awake and responds to simple questions  Post-anesthesia pain: pain controlled  Post-anesthesia nausea and vomiting: no  Pulmonary: unassisted, return to baseline  Cardiovascular: stable and blood pressure at baseline  Hydration: adequate  Anesthetic events: no    QCDR Measures:  ASA# 11 - Gabby-op Cardiac Arrest: ASA11B - Patient did NOT experience unanticipated cardiac arrest  ASA# 12 - Gabby-op Mortality Rate: ASA12B - Patient did NOT die  ASA# 13 - PACU Re-Intubation Rate: ASA13B - Patient did NOT require a new airway mgmt  ASA# 10 - Composite Anes Safety: ASA10A - No serious adverse event  ASA# 38 - New Corneal Injury: ASA38A - No new exposure keratitis or corneal abrasion in PACU    Additional Notes:

## 2021-06-08 NOTE — ANESTHESIA CARE TRANSFER NOTE
Last vitals:   Vitals:    01/03/17 1324   BP: 153/86   Pulse: 65   Resp: 24   Temp: 35.9  C (96.7  F)   SpO2: 100%     Patient's level of consciousness is drowsy  Spontaneous respirations: yes  Maintains airway independently: yes  Dentition unchanged: yes  Oropharynx: oropharynx clear of all foreign objects    QCDR Measures:  ASA# 20 - Surgical Safety Checklist: ASA20A - Safety Checks Done  PQRS# 430 - Adult PONV Prevention: 4558F - Pt received => 2 anti-emetic agents (different classes) preop & intraop  ASA# 8 - Peds PONV Prevention: NA - Not pediatric patient, not GA or 2 or more risk factors NOT present  PQRS# 424 - Gabby-op Temp Management: 4559F - At least one body temp DOCUMENTED => 35.5C or 95.9F within required timeframe  PQRS# 426 - PACU Transfer Protocol: - Transfer of care checklist used  ASA# 14 - Acute Post-op Pain: ASA14B - Patient did NOT experience pain >= 7 out of 10    I completed my SBAR handoff to the receiving nurse per policy and procedure.

## 2021-07-03 NOTE — ANESTHESIA PREPROCEDURE EVALUATION
Anesthesia Preprocedure Evaluation by Bashir Cardona MD at 1/3/2017 10:15 AM     Author: Bashir Cardona MD Service: -- Author Type: Physician    Filed: 1/3/2017 10:25 AM Date of Service: 1/3/2017 10:15 AM Status: Addendum    : Bashir Cardona MD (Physician)    Related Notes: Original Note by Bashir Cardona MD (Physician) filed at 1/3/2017 10:17 AM       Anesthesia Evaluation      Patient summary reviewed     Airway   Mallampati: II  Neck ROM: full   Pulmonary - normal exam                          Cardiovascular   Rhythm: regular  Rate: normal,         Neuro/Psych - negative ROS     Endo/Other    (+) obesity,      GI/Hepatic/Renal       Other findings: PONV      Dental                             Anesthesia Plan  Planned anesthetic: general endotracheal    ASA 3   Induction: intravenous   Anesthetic plan and risks discussed with: patient and spouse    Post-op plan: routine recovery

## 2021-07-13 ENCOUNTER — RECORDS - HEALTHEAST (OUTPATIENT)
Dept: ADMINISTRATIVE | Facility: CLINIC | Age: 71
End: 2021-07-13

## 2021-07-21 ENCOUNTER — RECORDS - HEALTHEAST (OUTPATIENT)
Dept: ADMINISTRATIVE | Facility: CLINIC | Age: 71
End: 2021-07-21

## 2021-07-24 ENCOUNTER — HEALTH MAINTENANCE LETTER (OUTPATIENT)
Age: 71
End: 2021-07-24

## 2021-08-24 NOTE — PROGRESS NOTES
[facility]    Hematology/Oncology Established Patient Follow-up Note      Today's Date: 8/30/2021    Reason for Follow-up: Bilateral breast cancer, status post bilateral mastectomies.    HISTORY OF PRESENT ILLNESS: Janelle Kolb is a 71 year old female who presents with the following oncologic history:  1.  12/03/2011: Biopsy of right breast at 9:00 showed proliferative changes including fibrosis, adenosis, apocrine metaplasia, and ductal hyperplasia without atypia, and associated microcalcifications with no evidence of malignancy.  2.  1/04/2012: Left upper inner breast biopsy showed atypical ductal hyperplasia and atypical lobular hyperplasia with associated calcifications.  3.  1/19/2012: Underwent lumpectomies at [facility] on the bilateral breasts.  Pathology showed a 0.5 x 0.4 x 0.4 cm left upper inner breast well-differentiated invasive ductal carcinoma, grade 1 with associated DCIS, lymphovascular invasion absent.  ER was positive at 75%, NY weakly positive at 3%, HER-2/olesya negative.  Right breast showed grade 1 invasive lobular carcinoma at 9:00, measuring 4 x 2 x 2 cm, multifocal with margins multifocally positive for invasive carcinoma; lymphovascular invasion present.  No lymph nodes were removed.  4.  3/07/2012: Underwent bilateral mastectomies with axillary lymph node dissection.  Pathology showed grade 2, left breast invasive mammary carcinoma with ductal differentiation measuring 3 mm and multifocal LCIS and focal DCIS; lymphovascular invasion not identified; 2/5 axillary lymph nodes involved with size of largest metastatic deposit 9.5 mm (later felt to be an intramammary lymph node); extranodal extension not identified.  Left-sided lymph node was ER positive at 75%, NY positive at 50%, HER-2/olesya negative by IHC.  Therefore, left breast [id number].  Pathology showed multifocal right breast invasive lobular carcinoma of overlapping sites.  Right breast showed the  multiple foci measuring up to 2.3 cm, invasive ductal carcinoma, grade 1 with 10/19 lymph nodes involved (8 with macro metastases and 2 with micrometastasis), with largest metastatic tumor deposit measuring 1.3 cm, extranodal extension not identified.  ER was positive at 97%, ND positive at 91%, HER-2/olesya negative with IHC = 0+.  Therefore, right breast pT2-pN3a.  5.  4/23/2012-10/01/2012: Received adjuvant chemotherapy with 4 cycles of dose dense Adriamycin and Cytoxan followed by 1 dose of Taxol and 3 cycles of Abraxane, under the care of Dr. Cassy Germain.  6.  12/27/2012: Completed adjuvant radiation therapy to the right chest wall.  7.  3/25/2013: Completed adjuvant radiation therapy to the left chest wall.  8.  6/2013: Started adjuvant letrozole.  This was interrupted between August and September 2013 due to side effects.      INTERIM HISTORY:  Janelle reports ongoing fatigue from when she had COVID-19 in the fall in 2020. 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  She is not excited to restart classes in-person at the . She still has aching from letrozole but is no longer taking ibuprofen and just tolerating it.    REVIEW OF SYSTEMS:   14 point ROS was reviewed and is negative other than as noted above in HPI.       HOME MEDICATIONS:  Current Outpatient Medications   Medication Sig Dispense Refill     Biotin 5 MG TBDP Take 5,000 mcg by mouth       Cholecalciferol (VITAMIN D) 1000 UNITS capsule Take 1 capsule by mouth daily 5, 000 iu       hydrochlorothiazide (HYDRODIURIL) 25 MG tablet Take 25 mg by mouth       ibuprofen (ADVIL,MOTRIN) 200 MG tablet Take 600 mg by mouth as needed.       letrozole (FEMARA) 2.5 MG tablet Take 1 tablet (2.5 mg) by mouth daily 90 tablet 3     losartan (COZAAR) 100 MG tablet Take 100 mg by mouth       metFORMIN (GLUCOPHAGE-XR) 500 MG 24 hr tablet TAKE 1 TABLET BY MOUTH ONCE DAILY WITH EVENING MEAL.       metoprolol (LOPRESSOR) 50 MG  tablet Take 50 mg by mouth daily.       sertraline (ZOLOFT) 50 MG tablet Take 75 mg by mouth       triamterene-hydrochlorothiazide (MAXZIDE) 75-50 MG per tablet Take 1 tablet by mouth. Patient reports she has only been taking when she notices edema in her legs/ankles. Her prescription states to take 1 tablet po once daily. (Patient not taking: Reported on 2021)           ALLERGIES:  Allergies   Allergen Reactions     Compazine [Prochlorperazine] Other (See Comments)     Severe extra pyramidal side effects  (muscles spasms, shakiness)     Erythromycin GI Disturbance     Latex Difficulty breathing         PAST MEDICAL HISTORY:  Past Medical History:   Diagnosis Date     Hypertension      Malignant neoplasm (H) 2012    BILATERAL BREAST CANCER INTO LYMPH NODES   BRCA2 mutation testing in  was negative.  Positive history of hormone replacement therapy, discontinued in .  Kidney stones.  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      PAST SURGICAL HISTORY:  Past Surgical History:   Procedure Laterality Date     ABDOMEN SURGERY       ANTERIOR CRUCIATE LIGAMENT REPAIR Right      APPENDECTOMY       APPENDECTOMY       ARTHROSCOPY KNEE Bilateral      BILATERAL SALPINGOOPHORECTOMY Bilateral     for family Hx ovarian CA     BIOPSY       BREAST EXCISIONAL BIOPSY Bilateral      BREAST SURGERY        SECTION      x2     CHOLECYSTECTOMY       COLONOSCOPY       CYSTOSCOPY      STENTS IN PAST X 2, THEN REMOVED     EXTRACORPOREAL SHOCK WAVE LITHOTRIPSY  ,     stent placed on left     FOOT ARTHRODESIS, SUBTALAR Bilateral      GENITOURINARY SURGERY       GI SURGERY       GYN SURGERY       KIDNEY STONE SURGERY      x3     LAPAROSCOPIC CHOLECYSTECTOMY N/A 1/3/2017    Procedure: LAPAROSCOPIC CHOLECYSTECTOMY;  Surgeon: Romeo Amanda MD;  Location: [facility];  Service:      LITHOTRIPSY       MASTECTOMY Bilateral 2012    Radical on right side, simple on left side. And Reconstruction.      MOHS MICROGRAPHIC PROCEDURE      basal cell     ORTHOPEDIC SURGERY       OTHER SURGICAL HISTORY Bilateral 2012    REMOVAL BREAST TISSUE EXPANDERSdue to infection     SOFT TISSUE SURGERY           SOCIAL HISTORY:  Social History     Socioeconomic History     Marital status:      Spouse name: Not on file     Number of children: Not on file     Years of education: Not on file     Highest education level: Not on file   Occupational History     Not on file   Tobacco Use     Smoking status: Never Smoker     Smokeless tobacco: Never Used   Substance and Sexual Activity     Alcohol use: Yes     Comment: occasional     Drug use: No     Sexual activity: Not on file   Other Topics Concern     Parent/sibling w/ CABG, MI or angioplasty before 65F 55M? Not Asked   Social History Narrative     Not on file     Social Determinants of Health     Financial Resource Strain:      Difficulty of Paying Living Expenses:    Food Insecurity:      Worried About Running Out of Food in the Last Year:      Ran Out of Food in the Last Year:    Transportation Needs:      Lack of Transportation (Medical):      Lack of Transportation (Non-Medical):    Physical Activity:      Days of Exercise per Week:      Minutes of Exercise per Session:    Stress:      Feeling of Stress :    Social Connections:      Frequency of Communication with Friends and Family:      Frequency of Social Gatherings with Friends and Family:      Attends Jew Services:      Active Member of Clubs or Organizations:      Attends Club or Organization Meetings:      Marital Status:    Intimate Partner Violence:      Fear of Current or Ex-Partner:      Emotionally Abused:      Physically Abused:      Sexually Abused:    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      FAMILY HISTORY:  Family History   Problem Relation Age of Onset     Cancer Mother         ovarian,  at age 70     Hypertension Mother      C.A.D. Mother      Hypertension Father      Breast  "Cancer Other 59        cousin         PHYSICAL EXAM:  Vital signs:  /88   Pulse 63   Temp 99.7  F (37.6  C)   Resp 18   Ht 1.702 m (5' 7\")   Wt 122 kg (269 lb)   LMP 03/06/2008   SpO2 97%   BMI 42.13 kg/m    GENERAL/CONSTITUTIONAL: No acute distress.  EYES: No scleral icterus.  LYMPH: No cervical, supraclavicular, axillary adenopathy.   BREAST: Bilateral breasts are surgically absent with no palpable chest wall masses or fluid, although scar tissue is more prominent on the right side.  No erythema, rash, or ulcerations.  RESPIRATORY: No audible cough or wheezing.   GASTROINTESTINAL: No hepatosplenomegaly, masses, or tenderness. No guarding.  No distention.  MUSCULOSKELETAL: Warm and well-perfused, no cyanosis, clubbing, or edema.  NEUROLOGIC: No focal motor deficits. Alert, oriented, answers questions appropriately.  INTEGUMENTARY: No rashes or jaundice.  GAIT: Steady, does not use assistive device    LABS:  None.    PATHOLOGY:  None new.    IMAGING:  None new.    ASSESSMENT/PLAN:  Janelle Kolb is a 71 year old female with the following issues:  1.  Stage IIIA, right breast invasive lobular carcinoma of overlapping sites, grade 1, ER positive, IL positive, HER-2/olesya negative status post right mastectomy  2.  Stage II, left upper inner breast invasive ductal carcinoma, grade 2, ER positive, IL positive, HER-2/olesya negative, status post left mastectomy  3.  Morbid obesity  4. Borderline osteopenia  -I discussed with Janelle that she has no clinical evidence for recurrent breast cancer by physical exam today.    -Discussed that she has the option of discontinuing letrozole now that she has completed 8 years of hormone blockade therapy given the findings of ABCSG-16 trial which showed similar disease-free survival and overall survival between postmenopausal patients who took hormone blockade therapy with AI for 7 years for 10 years. She would prefer to stay on letrozole for 10 years.  -Plan to repeat " DEXA scan every 2 years, overdue since 9/2020 -- will arrange and notify results.  -Will arrange for lymphedema therapy to help with the right sided discomfort associated with scar tissue.  5.  Vitamin D deficiency  - Continue vitamin D supplementation.  She declines taking calcium supplementation due to history of kidney stones.  6.  Peripheral neuropathy, drug-induced  -This is related to past taxane use but mild in her hands and feet.  Continue observation.  7.  Atrophic vaginitis  -Stable.  She is willing to try vaginal estrogen cream.  8. Urinary incontinence  --Offered oxybutynin ER 15 mg once daily. She would like to try it.    Return in 1 year.    Lana Aguilar MD  Hematology/Oncology  [facility]

## 2021-08-30 ENCOUNTER — ONCOLOGY VISIT (OUTPATIENT)
Dept: ONCOLOGY | Facility: CLINIC | Age: 71
End: 2021-08-30
Attending: INTERNAL MEDICINE
Payer: COMMERCIAL

## 2021-08-30 VITALS
BODY MASS INDEX: 42.22 KG/M2 | TEMPERATURE: 99.7 F | OXYGEN SATURATION: 97 % | DIASTOLIC BLOOD PRESSURE: 88 MMHG | RESPIRATION RATE: 18 BRPM | HEIGHT: 67 IN | WEIGHT: 269 LBS | HEART RATE: 63 BPM | SYSTOLIC BLOOD PRESSURE: 131 MMHG

## 2021-08-30 DIAGNOSIS — C50.212 MALIGNANT NEOPLASM OF UPPER-INNER QUADRANT OF LEFT BREAST IN FEMALE, ESTROGEN RECEPTOR POSITIVE (H): ICD-10-CM

## 2021-08-30 DIAGNOSIS — G62.0 DRUG-INDUCED POLYNEUROPATHY (H): ICD-10-CM

## 2021-08-30 DIAGNOSIS — N95.2 ATROPHIC VAGINITIS: ICD-10-CM

## 2021-08-30 DIAGNOSIS — N39.41 URGE INCONTINENCE OF URINE: ICD-10-CM

## 2021-08-30 DIAGNOSIS — M85.852 OSTEOPENIA OF LEFT HIP: ICD-10-CM

## 2021-08-30 DIAGNOSIS — Z17.0 MALIGNANT NEOPLASM OF OVERLAPPING SITES OF RIGHT BREAST IN FEMALE, ESTROGEN RECEPTOR POSITIVE (H): Primary | ICD-10-CM

## 2021-08-30 DIAGNOSIS — Z17.0 MALIGNANT NEOPLASM OF UPPER-INNER QUADRANT OF LEFT BREAST IN FEMALE, ESTROGEN RECEPTOR POSITIVE (H): ICD-10-CM

## 2021-08-30 DIAGNOSIS — C50.811 MALIGNANT NEOPLASM OF OVERLAPPING SITES OF RIGHT BREAST IN FEMALE, ESTROGEN RECEPTOR POSITIVE (H): Primary | ICD-10-CM

## 2021-08-30 PROCEDURE — 99214 OFFICE O/P EST MOD 30 MIN: CPT | Performed by: INTERNAL MEDICINE

## 2021-08-30 PROCEDURE — G0463 HOSPITAL OUTPT CLINIC VISIT: HCPCS

## 2021-08-30 RX ORDER — ESTRADIOL 0.1 MG/G
2 CREAM VAGINAL
Qty: 42.5 G | Refills: 3 | Status: SHIPPED | OUTPATIENT
Start: 2021-08-30

## 2021-08-30 RX ORDER — OXYBUTYNIN CHLORIDE 15 MG/1
15 TABLET, EXTENDED RELEASE ORAL DAILY
Qty: 30 TABLET | Refills: 3 | Status: SHIPPED | OUTPATIENT
Start: 2021-08-30 | End: 2021-09-27

## 2021-08-30 ASSESSMENT — MIFFLIN-ST. JEOR: SCORE: 1772.81

## 2021-08-30 ASSESSMENT — PAIN SCALES - GENERAL: PAINLEVEL: MILD PAIN (3)

## 2021-08-30 NOTE — LETTER
8/30/2021         RE: Janelle Kolb  [address] [id number]        Dear Colleague,    Thank you for referring your patient, Janelle Kolb, to the [facility]. Please see a copy of my visit note below.    [facility]    Hematology/Oncology Established Patient Follow-up Note      Today's Date: 8/30/2021    Reason for Follow-up: Bilateral breast cancer, status post bilateral mastectomies.    HISTORY OF PRESENT ILLNESS: Janelle Kolb is a 71 year old female who presents with the following oncologic history:  1.  12/03/2011: Biopsy of right breast at 9:00 showed proliferative changes including fibrosis, adenosis, apocrine metaplasia, and ductal hyperplasia without atypia, and associated microcalcifications with no evidence of malignancy.  2.  1/04/2012: Left upper inner breast biopsy showed atypical ductal hyperplasia and atypical lobular hyperplasia with associated calcifications.  3.  1/19/2012: Underwent lumpectomies at [facility] on the bilateral breasts.  Pathology showed a 0.5 x 0.4 x 0.4 cm left upper inner breast well-differentiated invasive ductal carcinoma, grade 1 with associated DCIS, lymphovascular invasion absent.  ER was positive at 75%, CO weakly positive at 3%, HER-2/olesya negative.  Right breast showed grade 1 invasive lobular carcinoma at 9:00, measuring 4 x 2 x 2 cm, multifocal with margins multifocally positive for invasive carcinoma; lymphovascular invasion present.  No lymph nodes were removed.  4.  3/07/2012: Underwent bilateral mastectomies with axillary lymph node dissection.  Pathology showed grade 2, left breast invasive mammary carcinoma with ductal differentiation measuring 3 mm and multifocal LCIS and focal DCIS; lymphovascular invasion not identified; 2/5 axillary lymph nodes involved with size of largest metastatic deposit 9.5 mm (later felt to be an intramammary lymph node);  extranodal extension not identified.  Left-sided lymph node was ER positive at 75%, CA positive at 50%, HER-2/olesya negative by IHC.  Therefore, left breast [id number].  Pathology showed multifocal right breast invasive lobular carcinoma of overlapping sites.  Right breast showed the multiple foci measuring up to 2.3 cm, invasive ductal carcinoma, grade 1 with 10/19 lymph nodes involved (8 with macro metastases and 2 with micrometastasis), with largest metastatic tumor deposit measuring 1.3 cm, extranodal extension not identified.  ER was positive at 97%, CA positive at 91%, HER-2/olesya negative with IHC = 0+.  Therefore, right breast pT2-pN3a.  5.  4/23/2012-10/01/2012: Received adjuvant chemotherapy with 4 cycles of dose dense Adriamycin and Cytoxan followed by 1 dose of Taxol and 3 cycles of Abraxane, under the care of Dr. Cassy Germain.  6.  12/27/2012: Completed adjuvant radiation therapy to the right chest wall.  7.  3/25/2013: Completed adjuvant radiation therapy to the left chest wall.  8.  6/2013: Started adjuvant letrozole.  This was interrupted between August and September 2013 due to side effects.      INTERIM HISTORY:  Janelle reports ongoing fatigue from when she had COVID-19 in the fall in 2020. 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  She is not excited to restart classes in-person at the . She still has aching from letrozole but is no longer taking ibuprofen and just tolerating it.    REVIEW OF SYSTEMS:   14 point ROS was reviewed and is negative other than as noted above in HPI.       HOME MEDICATIONS:  Current Outpatient Medications   Medication Sig Dispense Refill     Biotin 5 MG TBDP Take 5,000 mcg by mouth       Cholecalciferol (VITAMIN D) 1000 UNITS capsule Take 1 capsule by mouth daily 5, 000 iu       hydrochlorothiazide (HYDRODIURIL) 25 MG tablet Take 25 mg by mouth       ibuprofen (ADVIL,MOTRIN) 200 MG tablet Take 600 mg by mouth as needed.        letrozole (FEMARA) 2.5 MG tablet Take 1 tablet (2.5 mg) by mouth daily 90 tablet 3     losartan (COZAAR) 100 MG tablet Take 100 mg by mouth       metFORMIN (GLUCOPHAGE-XR) 500 MG 24 hr tablet TAKE 1 TABLET BY MOUTH ONCE DAILY WITH EVENING MEAL.       metoprolol (LOPRESSOR) 50 MG tablet Take 50 mg by mouth daily.       sertraline (ZOLOFT) 50 MG tablet Take 75 mg by mouth       triamterene-hydrochlorothiazide (MAXZIDE) 75-50 MG per tablet Take 1 tablet by mouth. Patient reports she has only been taking when she notices edema in her legs/ankles. Her prescription states to take 1 tablet po once daily. (Patient not taking: Reported on 2021)           ALLERGIES:  Allergies   Allergen Reactions     Compazine [Prochlorperazine] Other (See Comments)     Severe extra pyramidal side effects  (muscles spasms, shakiness)     Erythromycin GI Disturbance     Latex Difficulty breathing         PAST MEDICAL HISTORY:  Past Medical History:   Diagnosis Date     Hypertension      Malignant neoplasm (H) 2012    BILATERAL BREAST CANCER INTO LYMPH NODES   BRCA2 mutation testing in  was negative.  Positive history of hormone replacement therapy, discontinued in .  Kidney stones.  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      PAST SURGICAL HISTORY:  Past Surgical History:   Procedure Laterality Date     ABDOMEN SURGERY       ANTERIOR CRUCIATE LIGAMENT REPAIR Right      APPENDECTOMY       APPENDECTOMY       ARTHROSCOPY KNEE Bilateral      BILATERAL SALPINGOOPHORECTOMY Bilateral     for family Hx ovarian CA     BIOPSY       BREAST EXCISIONAL BIOPSY Bilateral      BREAST SURGERY        SECTION      x2     CHOLECYSTECTOMY       COLONOSCOPY       CYSTOSCOPY      STENTS IN PAST X 2, THEN REMOVED     EXTRACORPOREAL SHOCK WAVE LITHOTRIPSY  ,     stent placed on left     FOOT ARTHRODESIS, SUBTALAR Bilateral      GENITOURINARY SURGERY       GI SURGERY       GYN SURGERY       KIDNEY STONE SURGERY      x3      LAPAROSCOPIC CHOLECYSTECTOMY N/A 1/3/2017    Procedure: LAPAROSCOPIC CHOLECYSTECTOMY;  Surgeon: Romeo Amanda MD;  Location: [facility];  Service:      LITHOTRIPSY       MASTECTOMY Bilateral 03/2012    Radical on right side, simple on left side. And Reconstruction.     MOHS MICROGRAPHIC PROCEDURE      basal cell     ORTHOPEDIC SURGERY       OTHER SURGICAL HISTORY Bilateral 05/2012    REMOVAL BREAST TISSUE EXPANDERSdue to infection     SOFT TISSUE SURGERY           SOCIAL HISTORY:  Social History     Socioeconomic History     Marital status:      Spouse name: Not on file     Number of children: Not on file     Years of education: Not on file     Highest education level: Not on file   Occupational History     Not on file   Tobacco Use     Smoking status: Never Smoker     Smokeless tobacco: Never Used   Substance and Sexual Activity     Alcohol use: Yes     Comment: occasional     Drug use: No     Sexual activity: Not on file   Other Topics Concern     Parent/sibling w/ CABG, MI or angioplasty before 65F 55M? Not Asked   Social History Narrative     Not on file     Social Determinants of Health     Financial Resource Strain:      Difficulty of Paying Living Expenses:    Food Insecurity:      Worried About Running Out of Food in the Last Year:      Ran Out of Food in the Last Year:    Transportation Needs:      Lack of Transportation (Medical):      Lack of Transportation (Non-Medical):    Physical Activity:      Days of Exercise per Week:      Minutes of Exercise per Session:    Stress:      Feeling of Stress :    Social Connections:      Frequency of Communication with Friends and Family:      Frequency of Social Gatherings with Friends and Family:      Attends Jewish Services:      Active Member of Clubs or Organizations:      Attends Club or Organization Meetings:      Marital Status:    Intimate Partner Violence:      Fear of Current or Ex-Partner:      Emotionally Abused:      Physically  "Abused:      Sexually Abused:    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      FAMILY HISTORY:  Family History   Problem Relation Age of Onset     Cancer Mother         ovarian,  at age 70     Hypertension Mother      C.A.D. Mother      Hypertension Father      Breast Cancer Other 59        cousin         PHYSICAL EXAM:  Vital signs:  /88   Pulse 63   Temp 99.7  F (37.6  C)   Resp 18   Ht 1.702 m (5' 7\")   Wt 122 kg (269 lb)   LMP 2008   SpO2 97%   BMI 42.13 kg/m    GENERAL/CONSTITUTIONAL: No acute distress.  EYES: No scleral icterus.  LYMPH: No cervical, supraclavicular, axillary adenopathy.   BREAST: Bilateral breasts are surgically absent with no palpable chest wall masses or fluid, although scar tissue is more prominent on the right side.  No erythema, rash, or ulcerations.  RESPIRATORY: No audible cough or wheezing.   GASTROINTESTINAL: No hepatosplenomegaly, masses, or tenderness. No guarding.  No distention.  MUSCULOSKELETAL: Warm and well-perfused, no cyanosis, clubbing, or edema.  NEUROLOGIC: No focal motor deficits. Alert, oriented, answers questions appropriately.  INTEGUMENTARY: No rashes or jaundice.  GAIT: Steady, does not use assistive device    LABS:  None.    PATHOLOGY:  None new.    IMAGING:  None new.    ASSESSMENT/PLAN:  Janelle Kolb is a 71 year old female with the following issues:  1.  Stage IIIA, right breast invasive lobular carcinoma of overlapping sites, grade 1, ER positive, MD positive, HER-2/olesya negative status post right mastectomy  2.  Stage II, left upper inner breast invasive ductal carcinoma, grade 2, ER positive, MD positive, HER-2/olesya negative, status post left mastectomy  3.  Morbid obesity  4. Borderline osteopenia  -I discussed with Janelle that she has no clinical evidence for recurrent breast cancer by physical exam today.    -Discussed that she has the option of discontinuing letrozole now that she has completed 8 years of hormone " "blockade therapy given the findings of ABCSG-16 trial which showed similar disease-free survival and overall survival between postmenopausal patients who took hormone blockade therapy with AI for 7 years for 10 years. She would prefer to stay on letrozole for 10 years.  -Plan to repeat DEXA scan every 2 years, overdue since 9/2020 -- will arrange and notify results.  -Will arrange for lymphedema therapy to help with the right sided discomfort associated with scar tissue.  5.  Vitamin D deficiency  - Continue vitamin D supplementation.  She declines taking calcium supplementation due to history of kidney stones.  6.  Peripheral neuropathy, drug-induced  -This is related to past taxane use but mild in her hands and feet.  Continue observation.  7.  Atrophic vaginitis  -Stable.  She is willing to try vaginal estrogen cream.  8. Urinary incontinence  --Offered oxybutynin ER 15 mg once daily. She would like to try it.    Return in 1 year.    Lana Aguilar MD  Hematology/Oncology  [facility]    Oncology Rooming Note    August 30, 2021 9:25 AM   Janelle Kolb is a 71 year old female who presents for:    Chief Complaint   Patient presents with     Oncology Clinic Visit     Initial Vitals: LMP 03/06/2008  Estimated body mass index is 40.72 kg/m  as calculated from the following:    Height as of 11/11/19: 1.702 m (5' 7\").    Weight as of 11/11/19: 117.9 kg (260 lb). There is no height or weight on file to calculate BSA.  Data Unavailable Comment: Data Unavailable   Patient's last menstrual period was 03/06/2008.  Allergies reviewed: Yes  Medications reviewed: Yes    Medications: Medication refills not needed today.  Pharmacy name entered into [facility]:    [id number] [place] [facility] [address] [facility] [place], [place] - [address] [address]    Clinical concerns:  doctor was notified.      Tete Dumont MA                Again, thank you for allowing me to " participate in the care of your patient.        Sincerely,        Lana Aguilar MD

## 2021-08-30 NOTE — PROGRESS NOTES
"Oncology Rooming Note    August 30, 2021 9:25 AM   Janelle Kolb is a 71 year old female who presents for:    Chief Complaint   Patient presents with     Oncology Clinic Visit     Initial Vitals: LMP 03/06/2008  Estimated body mass index is 40.72 kg/m  as calculated from the following:    Height as of 11/11/19: 1.702 m (5' 7\").    Weight as of 11/11/19: 117.9 kg (260 lb). There is no height or weight on file to calculate BSA.  Data Unavailable Comment: Data Unavailable   Patient's last menstrual period was 03/06/2008.  Allergies reviewed: Yes  Medications reviewed: Yes    Medications: Medication refills not needed today.  Pharmacy name entered into [facility]:    [id number] [address] [place] [address] PHARMACY [place], [place] - [address] [address]    Clinical concerns:  doctor was notified.      Tete Dumont MA            "

## 2021-09-18 ENCOUNTER — HEALTH MAINTENANCE LETTER (OUTPATIENT)
Age: 71
End: 2021-09-18

## 2021-09-27 DIAGNOSIS — N39.41 URGE INCONTINENCE OF URINE: ICD-10-CM

## 2021-09-27 RX ORDER — OXYBUTYNIN CHLORIDE 15 MG/1
15 TABLET, EXTENDED RELEASE ORAL DAILY
Qty: 30 TABLET | Refills: 3 | Status: SHIPPED | OUTPATIENT
Start: 2021-09-27 | End: 2022-08-31

## 2021-11-12 ENCOUNTER — HOSPITAL ENCOUNTER (OUTPATIENT)
Dept: BONE DENSITY | Facility: CLINIC | Age: 71
Discharge: HOME | End: 2021-11-12
Attending: INTERNAL MEDICINE | Admitting: INTERNAL MEDICINE
Payer: COMMERCIAL

## 2021-11-12 DIAGNOSIS — M85.852 OSTEOPENIA OF LEFT HIP: ICD-10-CM

## 2021-11-12 PROCEDURE — 77080 DXA BONE DENSITY AXIAL: CPT

## 2021-12-29 RX ORDER — LETROZOLE 2.5 MG/1
2.5 TABLET, FILM COATED ORAL DAILY
Qty: 90 TABLET | Refills: 3 | Status: SHIPPED | OUTPATIENT
Start: 2021-12-29 | End: 2022-08-31

## 2022-08-12 NOTE — PROGRESS NOTES
[facility] [place] [facility]    Hematology/Oncology Established Patient Follow-up Note      Today's Date: 8/31/2022    Reason for Follow-up: Bilateral breast cancer, status post bilateral mastectomies.    HISTORY OF PRESENT ILLNESS: Janelle Kolb is a 72 year old female who presents with the following oncologic history:  1.  12/03/2011: Biopsy of right breast at 9:00 showed proliferative changes including fibrosis, adenosis, apocrine metaplasia, and ductal hyperplasia without atypia, and associated microcalcifications with no evidence of malignancy.  2.  1/04/2012: Left upper inner breast biopsy showed atypical ductal hyperplasia and atypical lobular hyperplasia with associated calcifications.  3.  1/19/2012: Underwent lumpectomies at [facility] on the bilateral breasts.  Pathology showed a 0.5 x 0.4 x 0.4 cm left upper inner breast well-differentiated invasive ductal carcinoma, grade 1 with associated DCIS, lymphovascular invasion absent.  ER was positive at 75%, AR weakly positive at 3%, HER-2/olesya negative.  Right breast showed grade 1 invasive lobular carcinoma at 9:00, measuring 4 x 2 x 2 cm, multifocal with margins multifocally positive for invasive carcinoma; lymphovascular invasion present.  No lymph nodes were removed.  4.  3/07/2012: Underwent bilateral mastectomies with axillary lymph node dissection.  Pathology showed grade 2, left breast invasive mammary carcinoma with ductal differentiation measuring 3 mm and multifocal LCIS and focal DCIS; lymphovascular invasion not identified; 2/5 axillary lymph nodes involved with size of largest metastatic deposit 9.5 mm (later felt to be an intramammary lymph node); extranodal extension not identified.  Left-sided lymph node was ER positive at 75%, AR positive at 50%, HER-2/olesya negative by IHC.  Therefore, left breast [id number].  Pathology showed multifocal right breast invasive lobular carcinoma of overlapping sites.  Right breast showed the  multiple foci measuring up to 2.3 cm, invasive ductal carcinoma, grade 1 with 10/19 lymph nodes involved (8 with macro metastases and 2 with micrometastasis), with largest metastatic tumor deposit measuring 1.3 cm, extranodal extension not identified.  ER was positive at 97%, KS positive at 91%, HER-2/olesya negative with IHC = 0+.  Therefore, right breast pT2-pN3a.  5.  4/23/2012-10/01/2012: Received adjuvant chemotherapy with 4 cycles of dose dense Adriamycin and Cytoxan followed by 1 dose of Taxol and 3 cycles of Abraxane, under the care of Dr. Cassy Germain.  6.  12/27/2012: Completed adjuvant radiation therapy to the right chest wall.  7.  3/25/2013: Completed adjuvant radiation therapy to the left chest wall.  8.  6/2013: Started adjuvant letrozole.  This was interrupted between August and September 2013 due to side effects.      INTERIM HISTORY:  Janelle reports excitement for her ongoing Vietnamese studies.  She has had recurrent right arm lymphedema.    REVIEW OF SYSTEMS:   14 point ROS was reviewed and is negative other than as noted above in HPI.       HOME MEDICATIONS:  Current Outpatient Medications   Medication Sig Dispense Refill     Biotin 5 MG TBDP Take 5,000 mcg by mouth       Cholecalciferol (VITAMIN D) 1000 UNITS capsule Take 1 capsule by mouth daily 5, 000 iu       estradiol (ESTRACE) 0.1 MG/GM vaginal cream Place 2 g vaginally twice a week after initial application of once daily for 2 weeks 42.5 g 3     hydrochlorothiazide (HYDRODIURIL) 25 MG tablet Take 25 mg by mouth       ibuprofen (ADVIL,MOTRIN) 200 MG tablet Take 600 mg by mouth as needed.       letrozole (FEMARA) 2.5 MG tablet Take 1 tablet (2.5 mg) by mouth daily 90 tablet 3     losartan (COZAAR) 100 MG tablet Take 100 mg by mouth       metFORMIN (GLUCOPHAGE-XR) 500 MG 24 hr tablet TAKE 1 TABLET BY MOUTH ONCE DAILY WITH EVENING MEAL.       metoprolol (LOPRESSOR) 50 MG tablet Take 50 mg by mouth daily.       oxybutynin ER (DITROPAN XL) 15 MG 24  hr tablet Take 1 tablet (15 mg) by mouth daily 30 tablet 3     sertraline (ZOLOFT) 50 MG tablet Take 75 mg by mouth       triamterene-hydrochlorothiazide (MAXZIDE) 75-50 MG per tablet Take 1 tablet by mouth. Patient reports she has only been taking when she notices edema in her legs/ankles. Her prescription states to take 1 tablet po once daily. (Patient not taking: Reported on 2021)           ALLERGIES:  Allergies   Allergen Reactions     Compazine [Prochlorperazine] Other (See Comments)     Severe extra pyramidal side effects  (muscles spasms, shakiness)     Erythromycin GI Disturbance     Latex Difficulty breathing         PAST MEDICAL HISTORY:  Past Medical History:   Diagnosis Date     Hypertension      Malignant neoplasm (H) 2012    BILATERAL BREAST CANCER INTO LYMPH NODES   BRCA2 mutation testing in  was negative.  Positive history of hormone replacement therapy, discontinued in .  Kidney stones.  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      PAST SURGICAL HISTORY:  Past Surgical History:   Procedure Laterality Date     ABDOMEN SURGERY       ANTERIOR CRUCIATE LIGAMENT REPAIR Right      APPENDECTOMY       APPENDECTOMY       ARTHROSCOPY KNEE Bilateral      BILATERAL SALPINGOOPHORECTOMY Bilateral     for family Hx ovarian CA     BIOPSY       BREAST EXCISIONAL BIOPSY Bilateral      BREAST SURGERY        SECTION      x2     CHOLECYSTECTOMY       COLONOSCOPY       CYSTOSCOPY      STENTS IN PAST X 2, THEN REMOVED     EXTRACORPOREAL SHOCK WAVE LITHOTRIPSY  ,     stent placed on left     FOOT ARTHRODESIS, SUBTALAR Bilateral      GENITOURINARY SURGERY       GI SURGERY       GYN SURGERY       KIDNEY STONE SURGERY      x3     LAPAROSCOPIC CHOLECYSTECTOMY N/A 1/3/2017    Procedure: LAPAROSCOPIC CHOLECYSTECTOMY;  Surgeon: Romeo Amanda MD;  Location: [facility];  Service:      LITHOTRIPSY       MASTECTOMY Bilateral 2012    Radical on right side, simple on left side.  "And Reconstruction.     MOHS MICROGRAPHIC PROCEDURE      basal cell     ORTHOPEDIC SURGERY       OTHER SURGICAL HISTORY Bilateral 2012    REMOVAL BREAST TISSUE EXPANDERSdue to infection     SOFT TISSUE SURGERY           SOCIAL HISTORY:  Social History     Socioeconomic History     Marital status:      Spouse name: Not on file     Number of children: Not on file     Years of education: Not on file     Highest education level: Not on file   Occupational History     Not on file   Tobacco Use     Smoking status: Never Smoker     Smokeless tobacco: Never Used   Substance and Sexual Activity     Alcohol use: Yes     Comment: occasional     Drug use: No     Sexual activity: Not on file   Other Topics Concern     Parent/sibling w/ CABG, MI or angioplasty before 65F 55M? 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      FAMILY HISTORY:  Family History   Problem Relation Age of Onset     Cancer Mother         ovarian,  at age 70     Hypertension Mother      C.A.D. Mother      Hypertension Father      Breast Cancer Other 59        cousin         PHYSICAL EXAM:  Vital signs:  BP (!) 143/78   Pulse 61   Resp 16   Ht 1.702 m (5' 7\")   Wt 126.1 kg (278 lb)   LMP 2008   SpO2 98%   BMI 43.54 kg/m    GENERAL/CONSTITUTIONAL: No acute distress.  EYES: No scleral icterus.  LYMPH: No cervical, supraclavicular, axillary adenopathy.   BREAST: Bilateral breasts are surgically absent with no palpable chest wall masses or fluid, although scar tissue is more prominent on the right side.  No erythema, rash, or ulcerations.  RESPIRATORY: No audible cough " or wheezing.   GASTROINTESTINAL: No hepatosplenomegaly, masses, or tenderness. No guarding.  No distention.  MUSCULOSKELETAL: Warm and well-perfused, no cyanosis, clubbing; right arm lymphedema present.  NEUROLOGIC: No focal motor deficits. Alert, oriented, answers questions appropriately.  INTEGUMENTARY: No rashes or jaundice.  GAIT: Steady, does not use assistive device    LABS:  None.    PATHOLOGY:  None new.    IMAGING:  None new.    ASSESSMENT/PLAN:  Janelle Kolb is a 72 year old female with the following issues:  1.  Stage IIIA, right breast invasive lobular carcinoma of overlapping sites, grade 1, ER positive, ND positive, HER-2/olesya negative status post right mastectomy  2.  Stage II, left upper inner breast invasive ductal carcinoma, grade 2, ER positive, ND positive, HER-2/olesya negative, status post left mastectomy  3.  Morbid obesity  4.  Osteopenia right femoral neck  -I discussed with Janelle that she has no clinical evidence for recurrent breast cancer by physical exam today.    -Continue letrozole for 10 years through 6/2023.  -Last DEXA scan from 11/12/2021 showed mild osteopenia in the right femoral neck, mildly worse.  I advised adequate vitamin D and weightbearing exercise.  -Will represcribe lymphedema therapy to help with right arm lymphedema.  5.  Vitamin D deficiency  - Continue vitamin D supplementation.  She declines taking calcium supplementation due to history of kidney stones.  6.  Peripheral neuropathy, drug-induced  -This is related to past taxane use but mild in her hands and feet.  Continue observation.  7.  Atrophic vaginitis  -Stable.  I have now cautioned against vaginal estrogen cream due to recent new study showing potential increased risk for breast cancer recurrence for postmenopausal women who are on aromatase inhibitor therapy. She would like to continue with vaginal estrogen cream as she has almost completed her AI therapy.  8. Urinary incontinence  -- She tried  oxybutynin ER 15 mg once daily with brain fogginess so she stopped it.  -- The vaginal estrogen cream has been helping with her urinary incontinence.    Return in 1 year.    Lana Aguilar MD  Hematology/Oncology  [facility]    Total time spent: 30 minutes in patient evaluation, counseling, documentation, and coordination of care.

## 2022-08-20 ENCOUNTER — HEALTH MAINTENANCE LETTER (OUTPATIENT)
Age: 72
End: 2022-08-20

## 2022-08-31 ENCOUNTER — ONCOLOGY VISIT (OUTPATIENT)
Dept: ONCOLOGY | Facility: CLINIC | Age: 72
End: 2022-08-31
Attending: INTERNAL MEDICINE
Payer: COMMERCIAL

## 2022-08-31 VITALS
SYSTOLIC BLOOD PRESSURE: 143 MMHG | RESPIRATION RATE: 16 BRPM | DIASTOLIC BLOOD PRESSURE: 78 MMHG | WEIGHT: 278 LBS | BODY MASS INDEX: 43.63 KG/M2 | OXYGEN SATURATION: 98 % | HEART RATE: 61 BPM | HEIGHT: 67 IN

## 2022-08-31 DIAGNOSIS — G62.0 DRUG-INDUCED POLYNEUROPATHY (H): ICD-10-CM

## 2022-08-31 DIAGNOSIS — N39.41 URGE INCONTINENCE OF URINE: ICD-10-CM

## 2022-08-31 DIAGNOSIS — C50.811 MALIGNANT NEOPLASM OF OVERLAPPING SITES OF RIGHT BREAST IN FEMALE, ESTROGEN RECEPTOR POSITIVE (H): Primary | ICD-10-CM

## 2022-08-31 DIAGNOSIS — N95.2 ATROPHIC VAGINITIS: ICD-10-CM

## 2022-08-31 DIAGNOSIS — C50.212 MALIGNANT NEOPLASM OF UPPER-INNER QUADRANT OF LEFT BREAST IN FEMALE, ESTROGEN RECEPTOR POSITIVE (H): ICD-10-CM

## 2022-08-31 DIAGNOSIS — Z17.0 MALIGNANT NEOPLASM OF UPPER-INNER QUADRANT OF LEFT BREAST IN FEMALE, ESTROGEN RECEPTOR POSITIVE (H): ICD-10-CM

## 2022-08-31 DIAGNOSIS — Z17.0 MALIGNANT NEOPLASM OF OVERLAPPING SITES OF RIGHT BREAST IN FEMALE, ESTROGEN RECEPTOR POSITIVE (H): Primary | ICD-10-CM

## 2022-08-31 DIAGNOSIS — M85.852 OSTEOPENIA OF LEFT HIP: ICD-10-CM

## 2022-08-31 PROCEDURE — 99214 OFFICE O/P EST MOD 30 MIN: CPT | Performed by: INTERNAL MEDICINE

## 2022-08-31 PROCEDURE — G0463 HOSPITAL OUTPT CLINIC VISIT: HCPCS

## 2022-08-31 RX ORDER — LETROZOLE 2.5 MG/1
2.5 TABLET, FILM COATED ORAL DAILY
Qty: 90 TABLET | Refills: 1 | Status: SHIPPED | OUTPATIENT
Start: 2022-08-31 | End: 2023-04-03

## 2022-08-31 ASSESSMENT — PAIN SCALES - GENERAL: PAINLEVEL: NO PAIN (0)

## 2022-08-31 NOTE — LETTER
8/31/2022         RE: Janelle Kolb  [address] [address]        Dear Colleague,    Thank you for referring your patient, Janelle oKlb, to the [facility]. Please see a copy of my visit note below.    [facility]    Hematology/Oncology Established Patient Follow-up Note      Today's Date: 8/31/2022    Reason for Follow-up: Bilateral breast cancer, status post bilateral mastectomies.    HISTORY OF PRESENT ILLNESS: Janelle Kolb is a 72 year old female who presents with the following oncologic history:  1.  12/03/2011: Biopsy of right breast at 9:00 showed proliferative changes including fibrosis, adenosis, apocrine metaplasia, and ductal hyperplasia without atypia, and associated microcalcifications with no evidence of malignancy.  2.  1/04/2012: Left upper inner breast biopsy showed atypical ductal hyperplasia and atypical lobular hyperplasia with associated calcifications.  3.  1/19/2012: Underwent lumpectomies at [facility] on the bilateral breasts.  Pathology showed a 0.5 x 0.4 x 0.4 cm left upper inner breast well-differentiated invasive ductal carcinoma, grade 1 with associated DCIS, lymphovascular invasion absent.  ER was positive at 75%, NM weakly positive at 3%, HER-2/olesya negative.  Right breast showed grade 1 invasive lobular carcinoma at 9:00, measuring 4 x 2 x 2 cm, multifocal with margins multifocally positive for invasive carcinoma; lymphovascular invasion present.  No lymph nodes were removed.  4.  3/07/2012: Underwent bilateral mastectomies with axillary lymph node dissection.  Pathology showed grade 2, left breast invasive mammary carcinoma with ductal differentiation measuring 3 mm and multifocal LCIS and focal DCIS; lymphovascular invasion not identified; 2/5 axillary lymph nodes involved with size of largest metastatic deposit 9.5 mm (later felt to be an intramammary lymph node);  extranodal extension not identified.  Left-sided lymph node was ER positive at 75%, NE positive at 50%, HER-2/olesya negative by IHC.  Therefore, left breast [id number].  Pathology showed multifocal right breast invasive lobular carcinoma of overlapping sites.  Right breast showed the multiple foci measuring up to 2.3 cm, invasive ductal carcinoma, grade 1 with 10/19 lymph nodes involved (8 with macro metastases and 2 with micrometastasis), with largest metastatic tumor deposit measuring 1.3 cm, extranodal extension not identified.  ER was positive at 97%, NE positive at 91%, HER-2/olesya negative with IHC = 0+.  Therefore, right breast pT2-pN3a.  5.  4/23/2012-10/01/2012: Received adjuvant chemotherapy with 4 cycles of dose dense Adriamycin and Cytoxan followed by 1 dose of Taxol and 3 cycles of Abraxane, under the care of Dr. Cassy Germain.  6.  12/27/2012: Completed adjuvant radiation therapy to the right chest wall.  7.  3/25/2013: Completed adjuvant radiation therapy to the left chest wall.  8.  6/2013: Started adjuvant letrozole.  This was interrupted between August and September 2013 due to side effects.      INTERIM HISTORY:  Janelle reports excitement for her ongoing Maltese studies.  She has had recurrent right arm lymphedema.    REVIEW OF SYSTEMS:   14 point ROS was reviewed and is negative other than as noted above in HPI.       HOME MEDICATIONS:  Current Outpatient Medications   Medication Sig Dispense Refill     Biotin 5 MG TBDP Take 5,000 mcg by mouth       Cholecalciferol (VITAMIN D) 1000 UNITS capsule Take 1 capsule by mouth daily 5, 000 iu       estradiol (ESTRACE) 0.1 MG/GM vaginal cream Place 2 g vaginally twice a week after initial application of once daily for 2 weeks 42.5 g 3     hydrochlorothiazide (HYDRODIURIL) 25 MG tablet Take 25 mg by mouth       ibuprofen (ADVIL,MOTRIN) 200 MG tablet Take 600 mg by mouth as needed.       letrozole (FEMARA) 2.5 MG tablet Take 1 tablet (2.5 mg) by mouth daily 90  tablet 3     losartan (COZAAR) 100 MG tablet Take 100 mg by mouth       metFORMIN (GLUCOPHAGE-XR) 500 MG 24 hr tablet TAKE 1 TABLET BY MOUTH ONCE DAILY WITH EVENING MEAL.       metoprolol (LOPRESSOR) 50 MG tablet Take 50 mg by mouth daily.       oxybutynin ER (DITROPAN XL) 15 MG 24 hr tablet Take 1 tablet (15 mg) by mouth daily 30 tablet 3     sertraline (ZOLOFT) 50 MG tablet Take 75 mg by mouth       triamterene-hydrochlorothiazide (MAXZIDE) 75-50 MG per tablet Take 1 tablet by mouth. Patient reports she has only been taking when she notices edema in her legs/ankles. Her prescription states to take 1 tablet po once daily. (Patient not taking: Reported on 2021)           ALLERGIES:  Allergies   Allergen Reactions     Compazine [Prochlorperazine] Other (See Comments)     Severe extra pyramidal side effects  (muscles spasms, shakiness)     Erythromycin GI Disturbance     Latex Difficulty breathing         PAST MEDICAL HISTORY:  Past Medical History:   Diagnosis Date     Hypertension      Malignant neoplasm (H) 2012    BILATERAL BREAST CANCER INTO LYMPH NODES   BRCA2 mutation testing in  was negative.  Positive history of hormone replacement therapy, discontinued in .  Kidney stones.  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      PAST SURGICAL HISTORY:  Past Surgical History:   Procedure Laterality Date     ABDOMEN SURGERY       ANTERIOR CRUCIATE LIGAMENT REPAIR Right      APPENDECTOMY       APPENDECTOMY       ARTHROSCOPY KNEE Bilateral      BILATERAL SALPINGOOPHORECTOMY Bilateral     for family Hx ovarian CA     BIOPSY       BREAST EXCISIONAL BIOPSY Bilateral      BREAST SURGERY        SECTION      x2     CHOLECYSTECTOMY       COLONOSCOPY       CYSTOSCOPY      STENTS IN PAST X 2, THEN REMOVED     EXTRACORPOREAL SHOCK WAVE LITHOTRIPSY  ,     stent placed on left     FOOT ARTHRODESIS, SUBTALAR Bilateral      GENITOURINARY SURGERY       GI SURGERY       GYN SURGERY       KIDNEY  "STONE SURGERY      x3     LAPAROSCOPIC CHOLECYSTECTOMY N/A 1/3/2017    Procedure: LAPAROSCOPIC CHOLECYSTECTOMY;  Surgeon: Romeo Amanda MD;  Location: [facility];  Service:      LITHOTRIPSY       MASTECTOMY Bilateral 2012    Radical on right side, simple on left side. And Reconstruction.     MOHS MICROGRAPHIC PROCEDURE      basal cell     ORTHOPEDIC SURGERY       OTHER SURGICAL HISTORY Bilateral 2012    REMOVAL BREAST TISSUE EXPANDERSdue to infection     SOFT TISSUE SURGERY           SOCIAL HISTORY:  Social History     Socioeconomic History     Marital status:      Spouse name: Not on file     Number of children: Not on file     Years of education: Not on file     Highest education level: Not on file   Occupational History     Not on file   Tobacco Use     Smoking status: Never Smoker     Smokeless tobacco: Never Used   Substance and Sexual Activity     Alcohol use: Yes     Comment: occasional     Drug use: No     Sexual activity: Not on file   Other Topics Concern     Parent/sibling w/ CABG, MI or angioplasty before 65F 55M? 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      FAMILY HISTORY:  Family History   Problem Relation Age of Onset     Cancer Mother         ovarian,  at age 70     Hypertension Mother      C.A.D. Mother      Hypertension Father      Breast Cancer Other 59        cousin         PHYSICAL EXAM:  Vital signs:  BP (!) 143/78   Pulse 61   Resp 16   Ht 1.702 m (5' 7\")   Wt 126.1 kg (278 lb)   LMP 2008   SpO2 98%   BMI 43.54 kg/m    GENERAL/CONSTITUTIONAL: No acute distress.  EYES: No scleral " icterus.  LYMPH: No cervical, supraclavicular, axillary adenopathy.   BREAST: Bilateral breasts are surgically absent with no palpable chest wall masses or fluid, although scar tissue is more prominent on the right side.  No erythema, rash, or ulcerations.  RESPIRATORY: No audible cough or wheezing.   GASTROINTESTINAL: No hepatosplenomegaly, masses, or tenderness. No guarding.  No distention.  MUSCULOSKELETAL: Warm and well-perfused, no cyanosis, clubbing; right arm lymphedema present.  NEUROLOGIC: No focal motor deficits. Alert, oriented, answers questions appropriately.  INTEGUMENTARY: No rashes or jaundice.  GAIT: Steady, does not use assistive device    LABS:  None.    PATHOLOGY:  None new.    IMAGING:  None new.    ASSESSMENT/PLAN:  Janelle Kolb is a 72 year old female with the following issues:  1.  Stage IIIA, right breast invasive lobular carcinoma of overlapping sites, grade 1, ER positive, MT positive, HER-2/olesya negative status post right mastectomy  2.  Stage II, left upper inner breast invasive ductal carcinoma, grade 2, ER positive, MT positive, HER-2/olesya negative, status post left mastectomy  3.  Morbid obesity  4.  Osteopenia right femoral neck  -I discussed with Janelle that she has no clinical evidence for recurrent breast cancer by physical exam today.    -Continue letrozole for 10 years through 6/2023.  -Last DEXA scan from 11/12/2021 showed mild osteopenia in the right femoral neck, mildly worse.  I advised adequate vitamin D and weightbearing exercise.  -Will represcribe lymphedema therapy to help with right arm lymphedema.  5.  Vitamin D deficiency  - Continue vitamin D supplementation.  She declines taking calcium supplementation due to history of kidney stones.  6.  Peripheral neuropathy, drug-induced  -This is related to past taxane use but mild in her hands and feet.  Continue observation.  7.  Atrophic vaginitis  -Stable.  I have now cautioned against vaginal estrogen cream due to  "recent new study showing potential increased risk for breast cancer recurrence for postmenopausal women who are on aromatase inhibitor therapy. She would like to continue with vaginal estrogen cream as she has almost completed her AI therapy.  8. Urinary incontinence  -- She tried oxybutynin ER 15 mg once daily with brain fogginess so she stopped it.  -- The vaginal estrogen cream has been helping with her urinary incontinence.    Return in 1 year.    Lana Aguilar MD  Hematology/Oncology  [facility]    Total time spent: 30 minutes in patient evaluation, counseling, documentation, and coordination of care.    Oncology Rooming Note    August 31, 2022 10:07 AM   Janelle Kolb is a 72 year old female who presents for:    Chief Complaint   Patient presents with     Oncology Clinic Visit     Initial Vitals: LMP 03/06/2008  Estimated body mass index is 42.13 kg/m  as calculated from the following:    Height as of 8/30/21: 1.702 m (5' 7\").    Weight as of 8/30/21: 122 kg (269 lb). There is no height or weight on file to calculate BSA.  Data Unavailable Comment: Data Unavailable   Patient's last menstrual period was 03/06/2008.  Allergies reviewed: Yes  Medications reviewed: Yes    Medications: Medication refills not needed today.  Pharmacy name entered into [facility]:    [id number] [address] [place] [address] PHARMACY [place] [id number] [phone]    Clinical concerns:  doctor was notified.      Tete Dumont MA                Again, thank you for allowing me to participate in the care of your patient.        Sincerely,        Lana Aguilar MD    "

## 2022-08-31 NOTE — PROGRESS NOTES
"Oncology Rooming Note    August 31, 2022 10:07 AM   Janelle Kolb is a 72 year old female who presents for:    Chief Complaint   Patient presents with     Oncology Clinic Visit     Initial Vitals: LMP 03/06/2008  Estimated body mass index is 42.13 kg/m  as calculated from the following:    Height as of 8/30/21: 1.702 m (5' 7\").    Weight as of 8/30/21: 122 kg (269 lb). There is no height or weight on file to calculate BSA.  Data Unavailable Comment: Data Unavailable   Patient's last menstrual period was 03/06/2008.  Allergies reviewed: Yes  Medications reviewed: Yes    Medications: Medication refills not needed today.  Pharmacy name entered into [facility]:    [id number] [place] [facility] [address] [facility] PHARMACY [facility] [address] [address]    Clinical concerns:  doctor was notified.      Tete Dumont MA            "

## 2022-08-31 NOTE — PATIENT INSTRUCTIONS
Referral for lymphedema therapy.  RTC MD in 1 year.  Stop letrozole in June 2023 (completion of 10 years).

## 2022-10-05 ENCOUNTER — HOSPITAL ENCOUNTER (OUTPATIENT)
Dept: OCCUPATIONAL THERAPY | Facility: CLINIC | Age: 72
Discharge: HOME | End: 2022-10-05
Attending: INTERNAL MEDICINE
Payer: COMMERCIAL

## 2022-10-05 DIAGNOSIS — I97.2 LYMPHEDEMA SYNDROME, POSTMASTECTOMY: Primary | ICD-10-CM

## 2022-10-05 DIAGNOSIS — C50.811 MALIGNANT NEOPLASM OF OVERLAPPING SITES OF RIGHT BREAST IN FEMALE, ESTROGEN RECEPTOR POSITIVE (H): ICD-10-CM

## 2022-10-05 DIAGNOSIS — C50.212 MALIGNANT NEOPLASM OF UPPER-INNER QUADRANT OF LEFT BREAST IN FEMALE, ESTROGEN RECEPTOR POSITIVE (H): ICD-10-CM

## 2022-10-05 DIAGNOSIS — Z17.0 MALIGNANT NEOPLASM OF UPPER-INNER QUADRANT OF LEFT BREAST IN FEMALE, ESTROGEN RECEPTOR POSITIVE (H): ICD-10-CM

## 2022-10-05 DIAGNOSIS — Z17.0 MALIGNANT NEOPLASM OF OVERLAPPING SITES OF RIGHT BREAST IN FEMALE, ESTROGEN RECEPTOR POSITIVE (H): ICD-10-CM

## 2022-10-05 DIAGNOSIS — G56.31 LESION OF RIGHT RADIAL NERVE: ICD-10-CM

## 2022-10-05 PROCEDURE — 97535 SELF CARE MNGMENT TRAINING: CPT | Mod: GO

## 2022-10-05 PROCEDURE — 97165 OT EVAL LOW COMPLEX 30 MIN: CPT | Mod: GO

## 2022-10-12 ENCOUNTER — HOSPITAL ENCOUNTER (OUTPATIENT)
Dept: OCCUPATIONAL THERAPY | Facility: CLINIC | Age: 72
Discharge: HOME | End: 2022-10-12
Payer: COMMERCIAL

## 2022-10-12 DIAGNOSIS — C50.811 MALIGNANT NEOPLASM OF OVERLAPPING SITES OF RIGHT BREAST IN FEMALE, ESTROGEN RECEPTOR POSITIVE (H): Primary | ICD-10-CM

## 2022-10-12 DIAGNOSIS — I97.2 LYMPHEDEMA SYNDROME, POSTMASTECTOMY: ICD-10-CM

## 2022-10-12 DIAGNOSIS — Z17.0 MALIGNANT NEOPLASM OF OVERLAPPING SITES OF RIGHT BREAST IN FEMALE, ESTROGEN RECEPTOR POSITIVE (H): Primary | ICD-10-CM

## 2022-10-12 PROCEDURE — 97140 MANUAL THERAPY 1/> REGIONS: CPT | Mod: GO

## 2022-11-04 ENCOUNTER — HOSPITAL ENCOUNTER (OUTPATIENT)
Dept: OCCUPATIONAL THERAPY | Facility: CLINIC | Age: 72
Discharge: HOME | End: 2022-11-04
Payer: COMMERCIAL

## 2022-11-04 PROCEDURE — 97140 MANUAL THERAPY 1/> REGIONS: CPT | Mod: GO

## 2022-11-11 ENCOUNTER — HOSPITAL ENCOUNTER (OUTPATIENT)
Dept: OCCUPATIONAL THERAPY | Facility: CLINIC | Age: 72
Discharge: HOME | End: 2022-11-11
Payer: COMMERCIAL

## 2022-11-11 DIAGNOSIS — I97.2 LYMPHEDEMA SYNDROME, POSTMASTECTOMY: Primary | ICD-10-CM

## 2022-11-11 PROCEDURE — 97140 MANUAL THERAPY 1/> REGIONS: CPT | Mod: GO

## 2022-11-18 ENCOUNTER — HOSPITAL ENCOUNTER (OUTPATIENT)
Dept: OCCUPATIONAL THERAPY | Facility: CLINIC | Age: 72
Discharge: HOME | End: 2022-11-18
Payer: COMMERCIAL

## 2022-11-18 DIAGNOSIS — I97.2 LYMPHEDEMA SYNDROME, POSTMASTECTOMY: Primary | ICD-10-CM

## 2022-11-18 PROCEDURE — 97140 MANUAL THERAPY 1/> REGIONS: CPT | Mod: GO

## 2022-11-23 ENCOUNTER — HOSPITAL ENCOUNTER (OUTPATIENT)
Dept: OCCUPATIONAL THERAPY | Facility: CLINIC | Age: 72
Discharge: HOME | End: 2022-11-23
Payer: COMMERCIAL

## 2022-11-23 DIAGNOSIS — C50.811 MALIGNANT NEOPLASM OF OVERLAPPING SITES OF RIGHT BREAST IN FEMALE, ESTROGEN RECEPTOR POSITIVE (H): ICD-10-CM

## 2022-11-23 DIAGNOSIS — I97.2 LYMPHEDEMA SYNDROME, POSTMASTECTOMY: Primary | ICD-10-CM

## 2022-11-23 DIAGNOSIS — C50.212 MALIGNANT NEOPLASM OF UPPER-INNER QUADRANT OF LEFT BREAST IN FEMALE, ESTROGEN RECEPTOR POSITIVE (H): ICD-10-CM

## 2022-11-23 DIAGNOSIS — Z17.0 MALIGNANT NEOPLASM OF UPPER-INNER QUADRANT OF LEFT BREAST IN FEMALE, ESTROGEN RECEPTOR POSITIVE (H): ICD-10-CM

## 2022-11-23 DIAGNOSIS — Z17.0 MALIGNANT NEOPLASM OF OVERLAPPING SITES OF RIGHT BREAST IN FEMALE, ESTROGEN RECEPTOR POSITIVE (H): ICD-10-CM

## 2022-11-23 PROCEDURE — 97140 MANUAL THERAPY 1/> REGIONS: CPT | Mod: GO

## 2022-12-05 ENCOUNTER — THERAPY VISIT (OUTPATIENT)
Dept: OCCUPATIONAL THERAPY | Facility: CLINIC | Age: 72
End: 2022-12-05
Attending: INTERNAL MEDICINE
Payer: COMMERCIAL

## 2022-12-05 DIAGNOSIS — M25.531 RIGHT WRIST PAIN: ICD-10-CM

## 2022-12-05 DIAGNOSIS — G56.31 LESION OF RIGHT RADIAL NERVE: ICD-10-CM

## 2022-12-05 PROCEDURE — 97760 ORTHOTIC MGMT&TRAING 1ST ENC: CPT | Mod: GO | Performed by: OCCUPATIONAL THERAPIST

## 2022-12-05 PROCEDURE — 97140 MANUAL THERAPY 1/> REGIONS: CPT | Mod: GO | Performed by: OCCUPATIONAL THERAPIST

## 2022-12-05 PROCEDURE — 97112 NEUROMUSCULAR REEDUCATION: CPT | Mod: GO | Performed by: OCCUPATIONAL THERAPIST

## 2022-12-05 PROCEDURE — 97165 OT EVAL LOW COMPLEX 30 MIN: CPT | Mod: GO | Performed by: OCCUPATIONAL THERAPIST

## 2022-12-05 PROCEDURE — 97110 THERAPEUTIC EXERCISES: CPT | Mod: GO | Performed by: OCCUPATIONAL THERAPIST

## 2022-12-05 NOTE — PROGRESS NOTES
Hand Therapy Initial Evaluation    Current Date:  12/5/2022    Diagnosis: Right wrist pain  DOI: August 2022    Precautions: cancer; lymphodema    Subjective:  Janelle Kolb is a 72 year old female.    Patient reports symptoms of the right wrist pain which occurred due to lymphodema. Since onset symptoms are Gradually getting worse.  General health as reported by patient is good.  Pertinent medical history includes:Cancer, Depression, Diabetes, High Blood Pressure, Incontinence, Overweight  Medical allergies:latex.  Surgical history: other: cancer.  Medication history: Anti-depressants, High Blood Pressure.    Current occupation is none  Job Tasks: Computer Work, Repetitive Tasks    Occupational Profile Information:  Right hand dominant  Prior functional level:  no limitations  Patient reports symptoms of pain and weakness/loss of strength  Special tests:  none.    Previous treatment: lymphadema  Barriers include:none  Mobility: No difficulty  Transportation: drives  Currently retired, but taking classes at the [employer]  Leisure activities/hobbies: reading, TV, cooking  Other: gardening    Functional Outcome Measure:   Upper Extremity Functional Index Score:  SCORE:   Column Totals: /80: 48   (A lower score indicates greater disability.)    Objective:  Pain Level (Scale 0-10):   12/5/2022   At Rest 3   With Use 4-8     Pain Description:  Date 12/5/2022   Location wrist   Pain Quality Aching, Sharp, Shooting and Tender, burning   Frequency constant     Pain is worst  daytime   Exacerbated by  use   Relieved by NSAIDs   Progression worsening     Sensation   WNL throughout all nerve distributions; per patient report    Edema   - none  + mild    ++ moderate    +++ severe    12/5/2022   1st DC +++Left 18.4 cm  Right 21.0 cm   Radial Styloid +++      ROM  Pain Report: - none  + mild    ++ moderate    +++ severe   Thumb 12/5/2022 12/5/2022   AROM (PROM) Left Right   MP +/55 0/55-   IP 0/85 0/85-   RABD 55 42+++    PABD 45 42+   Opposition 10 1++      Wrist 12/5/2022 12/5/2022   AROM (PROM) Left Right   Extension 60 50+++   Flexion 75 65+   RD 17 15+++   UD 40 22+++   UD with Th Flex full      Resisted Testing  Pain Report: - none  + mild    ++ moderate    +++ severe    12/5/2022   APL +++   EPB +++   EPL +++   FCR +++     Strength   (Measured in pounds)  NT due pain    Special Tests   Pain Report:  - none    + mild    ++ moderate    +++ severe    12/5/2022   Finkelsteins +++   Radial Nerve Tinel's (DRSN) +++   Ap Joint Laxity of Trapezium NT   Crepitus Present NT   CMC Adduction Stress Test NT   CMC Extension Stress Test NT   WHAT test NT       Neural Tension Testing  RNT: Radial Neurodynamic Test (based on DS Armando's ULNT)   12/5/2022   0-5 Scale 1   Position:   0/5: Arm across abdomen in coronal plane  1/5: Depress shoulder, ER to neutral ABD shoulder to 45 degrees  2/5: IR shoulder to end range, keep elbow at 90 degrees  3/5: Extend elbow to 0 degrees  4/5: Fully pronate forearm  5/5: Flex wrist and fingers with UD  Notes:    (+) indicates beyond grade level but less than intermediate to next level    (-) indicates over intermediate to level    S1  onset/change of patient's symptoms    S2 definite stop point based on patient's discomfort level    Palpation  Pain Report:  - none    + mild    ++ moderate    +++ severe    12/5/2022   Radial Styloid +++   1st DC +++   FCR +++   Thumb CMC NT   PIN Site ++   Extensor Wad ++     Assessment:  Patient presents with symptoms consistent with diagnosis of right wrist pain, with conservative intervention.     Patient's limitations or Problem List includes:  Pain, Sensory disturbance, Decreased , Decreased pinch, Tightness in musculature and Adherence in connective tissue of the right wrist and thumb which interferes with the patient's ability to perform Self Care Tasks (dressing, eating, bathing, hygiene/toileting), Work Tasks, Sleep Patterns, Recreational Activities, Household Chores and  Driving  as compared to previous level of function.    Rehab Potential:  Good - Return to full activity, some limitations    Patient will benefit from skilled Occupational Therapy to increase flexibility and overall strength and decrease pain and edema to return to previous activity level and resume normal daily tasks and to reach their rehab potential.    Barriers to Learning:  No barrier    Communication Issues:  Patient appears to be able to clearly communicate and understand verbal and written communication and follow directions correctly.    Chart Review: Simple history review with patient    Identified Performance Deficits: bathing/showering, toileting, dressing, feeding, hygiene and grooming, care of others, driving and community mobility, health management and maintenance, home establishment and management, meal preparation and cleanup, Holiness and spiritual activities and expression, shopping, sleep, work and leisure activities    Assessment of Occupational Performance:  5 or more Performance Deficits    Clinical Decision Making (Complexity): Low complexity    Treatment Explanation:  The following has been discussed with the patient:  RX ordered/plan of care  Anticipated outcomes  Possible risks and side effects    Plan:  Frequency:  1 X week, once daily  Duration:  for 3 months    Treatment Plan:    Modalities:    US   Therapeutic Exercise:    AROM  Neuromuscular re-ed:   Nerve Gliding, Posture and Kinesiotaping  Manual Techniques:   Friction massage and Myofascial release  Orthotic Fabrication:    Static and Forearm based  Self Care:    Self Care Tasks and Ergonomic Considerations    Discharge Plan:  Achieve all LTG.  Independent in home treatment program.  Reach maximal therapeutic benefit.    Discharge Plan:    Achieve all LTG.  Independent in home treatment program.  Reach maximal therapeutic benefit.    Home Exercise Program:  Foam roller massage to scapular area; side upper arm and pec  "area  Exercise Name: Education Sheet General - Sessions: 1, Notes: perpendicular one direction massage on sore area. Massage from bottom of wrist to top of wrist.  Icing 5-7 x/day, EMR Notes: perpendicular one direction massage on sore area. Massage from bottom of wrist to top of wrist.  Icing 5-7 x/day  Exercise Name: deQuervain Overview - Sessions: 1, Notes: Golf ball massage wrist up to elbow area. 30 sec each trigger point.  Use your fingers to do \"strip\" motion in thumb sided wrist area.  Cold pack for 5-10 min. after massage  Exercise Name: Nerve Gliding Proximal Radial, Sets: 1 - Reps: 5-10 - Sessions: 2-4, Notes: only drop shoulder and slightly bend wrist. No pain, EMR Notes: only drop shoulder and slightly bend wrist. No pain  Doorway stretch  Vertical mouse  Potential split keyboard    Next Visit:    MFR  Nerve gliding  Posture education  Cont discussing computer ergo discussion          "

## 2022-12-19 ENCOUNTER — THERAPY VISIT (OUTPATIENT)
Dept: OCCUPATIONAL THERAPY | Facility: CLINIC | Age: 72
End: 2022-12-19
Payer: COMMERCIAL

## 2022-12-19 DIAGNOSIS — M25.531 RIGHT WRIST PAIN: Primary | ICD-10-CM

## 2022-12-19 PROCEDURE — 97112 NEUROMUSCULAR REEDUCATION: CPT | Mod: GO | Performed by: OCCUPATIONAL THERAPIST

## 2022-12-19 PROCEDURE — 97035 APP MDLTY 1+ULTRASOUND EA 15: CPT | Mod: GO | Performed by: OCCUPATIONAL THERAPIST

## 2022-12-19 PROCEDURE — 97140 MANUAL THERAPY 1/> REGIONS: CPT | Mod: GO | Performed by: OCCUPATIONAL THERAPIST

## 2022-12-19 NOTE — PROGRESS NOTES
SOAP note objective information for 12/19/2022.    Please refer to the daily flowsheet for treatment today, total treatment time and time spent performing 1:1 timed codes.       Objective:  Pain Level (Scale 0-10):   12/5/2022 12/19/2022   At Rest 3 1-2   With Use 4-8 4-5     Pain Description:  Date 12/5/2022 12/19/2022   Location wrist    Pain Quality Aching, Sharp, Shooting and Tender, burning    Frequency constant      Pain is worst  daytime    Exacerbated by  use    Relieved by NSAIDs    Progression worsening improving     Sensation   WNL throughout all nerve distributions; per patient report    Edema   - none  + mild    ++ moderate    +++ severe    12/5/2022 12/19/2022   1st DC +++Left 18.4 cm  Right 21.0 cm ++  Right 21.0 cm   Radial Styloid +++ ++      ROM  Pain Report: - none  + mild    ++ moderate    +++ severe   Thumb 12/5/2022 12/5/2022 12/19/2022   AROM (PROM) Left Right Right   MP +/55 0/55-    IP 0/85 0/85-    RABD 55 42+++ 30++   PABD 45 42+ 42++   Opposition 10 1++       Wrist 12/5/2022 12/5/2022 12/19/2022   AROM (PROM) Left Right Right   Extension 60 50+++ 53++   Flexion 75 65+ 70+   RD 17 15+++ 20+++   UD 40 22+++ 20+++   UD with Th Flex full       Resisted Testing  Pain Report: - none  + mild    ++ moderate    +++ severe    12/5/2022   APL +++   EPB +++   EPL +++   FCR +++     Strength   (Measured in pounds)  NT due pain    Special Tests   Pain Report:  - none    + mild    ++ moderate    +++ severe    12/5/2022   Finkelsteins +++   Radial Nerve Tinel's (DRSN) +++   Ap Joint Laxity of Trapezium NT   Crepitus Present NT   CMC Adduction Stress Test NT   CMC Extension Stress Test NT   WHAT test NT       Neural Tension Testing  RNT: Radial Neurodynamic Test (based on DS Prabha's ULNT)   12/5/2022   0-5 Scale 1   Position:   0/5: Arm across abdomen in coronal plane  1/5: Depress shoulder, ER to neutral ABD shoulder to 45 degrees  2/5: IR shoulder to end range, keep elbow at 90 degrees  3/5: Extend  "elbow to 0 degrees  4/5: Fully pronate forearm  5/5: Flex wrist and fingers with UD  Notes:    (+) indicates beyond grade level but less than MCC to next level    (-) indicates over MCC to level    S1  onset/change of patient's symptoms    S2 definite stop point based on patient's discomfort level    Palpation  Pain Report:  - none    + mild    ++ moderate    +++ severe    12/5/2022    Radial Styloid +++    1st DC +++    FCR +++    Thumb CMC NT    PIN Site ++    Extensor Wad ++        Treatment Plan:    Modalities:    US   Therapeutic Exercise:    AROM  Neuromuscular re-ed:   Nerve Gliding, Posture and Kinesiotaping  Manual Techniques:   Friction massage and Myofascial release  Orthotic Fabrication:    Static and Forearm based  Self Care:    Self Care Tasks and Ergonomic Considerations    Discharge Plan:  Achieve all LTG.  Independent in home treatment program.  Reach maximal therapeutic benefit.    Discharge Plan:    Achieve all LTG.  Independent in home treatment program.  Reach maximal therapeutic benefit.    Home Exercise Program:  Foam roller massage to scapular area; side upper arm and pec area  Exercise Name: Education Sheet General - Sessions: 1, Notes: perpendicular one direction massage on sore area. Massage from bottom of wrist to top of wrist.  Icing 5-7 x/day, EMR Notes: perpendicular one direction massage on sore area. Massage from bottom of wrist to top of wrist.  Icing 5-7 x/day  Exercise Name: deQuervain Overview - Sessions: 1, Notes: Golf ball massage wrist up to elbow area. 30 sec each trigger point.  Use your fingers to do \"strip\" motion in thumb sided wrist area.  Cold pack for 5-10 min. after massage  Exercise Name: Nerve Gliding Proximal Radial, Sets: 1 - Reps: 5-10 - Sessions: 2-4, Notes: only drop shoulder and slightly bend wrist. No pain, EMR Notes: only drop shoulder and slightly bend wrist. No pain  Doorway stretch  Vertical mouse  Potential split keyboard    12/19/2022  k-tape " (strips and one around wrist)  Cont wear orthosis HS and 1/2 or more day    Next Visit:  US  MFR  Nerve gliding  Posture education  Cont discussing computer ergo discussion

## 2022-12-25 NOTE — PROGRESS NOTES
SOAP note objective information for 12/26/2022.    Please refer to the daily flowsheet for treatment today, total treatment time and time spent performing 1:1 timed codes.       Objective:  Pain Level (Scale 0-10):   12/5/2022 12/19/2022 12/26/22 12/26/2022   At Rest 3 1-2 2-3 2-3   With Use 4-8 4-5 5 5     Pain Description:  Date 12/5/2022 12/19/2022   Location wrist    Pain Quality Aching, Sharp, Shooting and Tender, burning    Frequency constant      Pain is worst  daytime    Exacerbated by  use    Relieved by NSAIDs    Progression worsening improving     Sensation   WNL throughout all nerve distributions; per patient report    Edema   - none  + mild    ++ moderate    +++ severe    12/5/2022 12/19/2022   1st DC +++Left 18.4 cm  Right 21.0 cm ++  Right 21.0 cm   Radial Styloid +++ ++      ROM  Pain Report: - none  + mild    ++ moderate    +++ severe   Thumb 12/5/2022 12/5/2022 12/19/2022    AROM (PROM) Left Right Right    MP +/55 0/55-     IP 0/85 0/85-     RABD 55 42+++ 30++    PABD 45 42+ 42++    Opposition 10 1++        Wrist 12/5/2022 12/5/2022 12/19/2022    AROM (PROM) Left Right Right    Extension 60 50+++ 53++    Flexion 75 65+ 70+    RD 17 15+++ 20+++    UD 40 22+++ 20+++    UD with Th Flex full        Resisted Testing  Pain Report: - none  + mild    ++ moderate    +++ severe    12/5/2022   APL +++   EPB +++   EPL +++   FCR +++     Strength   (Measured in pounds)  NT due pain    Special Tests   Pain Report:  - none    + mild    ++ moderate    +++ severe    12/5/2022   Finkelsteins +++   Radial Nerve Tinel's (DRSN) +++   Ap Joint Laxity of Trapezium NT   Crepitus Present NT   CMC Adduction Stress Test NT   CMC Extension Stress Test NT   WHAT test NT       Neural Tension Testing  RNT: Radial Neurodynamic Test (based on DS Prabha's ULNT)   12/5/2022   0-5 Scale 1   Position:   0/5: Arm across abdomen in coronal plane  1/5: Depress shoulder, ER to neutral ABD shoulder to 45 degrees  2/5: IR shoulder to end  "range, keep elbow at 90 degrees  3/5: Extend elbow to 0 degrees  4/5: Fully pronate forearm  5/5: Flex wrist and fingers with UD  Notes:    (+) indicates beyond grade level but less than alf to next level    (-) indicates over alf to level    S1  onset/change of patient's symptoms    S2 definite stop point based on patient's discomfort level    Palpation  Pain Report:  - none    + mild    ++ moderate    +++ severe    12/5/2022 12/26/2022   Radial Styloid +++ ++   1st DC +++ ++   FCR +++ ++   Thumb CMC NT +++   PIN Site ++ +   Extensor Wad ++ ++   Web space  +++       Treatment Plan:    Modalities:    US   Therapeutic Exercise:    AROM  Neuromuscular re-ed:   Nerve Gliding, Posture and Kinesiotaping  Manual Techniques:   Friction massage and Myofascial release  Orthotic Fabrication:    Static and Forearm based  Self Care:    Self Care Tasks and Ergonomic Considerations    Discharge Plan:  Achieve all LTG.  Independent in home treatment program.  Reach maximal therapeutic benefit.    Discharge Plan:    Achieve all LTG.  Independent in home treatment program.  Reach maximal therapeutic benefit.    Home Exercise Program:  Foam roller massage to scapular area; side upper arm and pec area  Exercise Name: Education Sheet General - Sessions: 1, Notes: perpendicular one direction massage on sore area. Massage from bottom of wrist to top of wrist.  Icing 5-7 x/day, EMR Notes: perpendicular one direction massage on sore area. Massage from bottom of wrist to top of wrist.  Icing 5-7 x/day  Exercise Name: deQuervain Overview - Sessions: 1, Notes: Golf ball massage wrist up to elbow area. 30 sec each trigger point.  Use your fingers to do \"strip\" motion in thumb sided wrist area.  Cold pack for 5-10 min. after massage  Exercise Name: Nerve Gliding Proximal Radial, Sets: 1 - Reps: 5-10 - Sessions: 2-4, Notes: only drop shoulder and slightly bend wrist. No pain, EMR Notes: only drop shoulder and slightly bend wrist. No " pain  Doorway stretch  Vertical mouse  Potential split keyboard    12/19/2022  k-tape (strips and one around wrist)  Cont wear orthosis HS and 1/2 or more day    12/26/2022  Exercise Name: [place] Massage to Thumb - Sessions: 1x/day or every other if sore, Notes: Use a golf ball. Hold on tender spot for 30-60 seconds.  Move to the next spot. Search for tender areas in the entire thumb pad.  Then put golf ball on web space and use a pencil to push down on top of web space, EMR Notes: Use a golf ball. Hold on tender spot for 30-60 seconds.  Move to the next spot. Search for tender areas in the entire thumb pad.  Then put golf ball on web space and use a pencil to push down on top of web space  Exercise Name: Thumb Stabilization Web Space Release Method 1 with Clip - Sessions: 1, Notes: 2-3 minutes  Exercise Name: Thumb Stabilization, Repositioning, Method 1(on chest) - Reps: 1 - Sessions: 1-2, Notes: Hold to the count of 5 or 10, EMR Notes: Hold to the count of 5 or 10    Next Visit:  US  MFR  Nerve gliding  Posture education  Cont discussing computer ergo discussion

## 2022-12-26 ENCOUNTER — THERAPY VISIT (OUTPATIENT)
Dept: OCCUPATIONAL THERAPY | Facility: CLINIC | Age: 72
End: 2022-12-26
Payer: COMMERCIAL

## 2022-12-26 ENCOUNTER — HOSPITAL ENCOUNTER (OUTPATIENT)
Dept: OCCUPATIONAL THERAPY | Facility: CLINIC | Age: 72
Discharge: HOME | End: 2022-12-26
Payer: COMMERCIAL

## 2022-12-26 DIAGNOSIS — M25.531 RIGHT WRIST PAIN: Primary | ICD-10-CM

## 2022-12-26 DIAGNOSIS — C50.212 MALIGNANT NEOPLASM OF UPPER-INNER QUADRANT OF LEFT BREAST IN FEMALE, ESTROGEN RECEPTOR POSITIVE (H): ICD-10-CM

## 2022-12-26 DIAGNOSIS — Z17.0 MALIGNANT NEOPLASM OF OVERLAPPING SITES OF RIGHT BREAST IN FEMALE, ESTROGEN RECEPTOR POSITIVE (H): ICD-10-CM

## 2022-12-26 DIAGNOSIS — C50.811 MALIGNANT NEOPLASM OF OVERLAPPING SITES OF RIGHT BREAST IN FEMALE, ESTROGEN RECEPTOR POSITIVE (H): ICD-10-CM

## 2022-12-26 DIAGNOSIS — Z17.0 MALIGNANT NEOPLASM OF UPPER-INNER QUADRANT OF LEFT BREAST IN FEMALE, ESTROGEN RECEPTOR POSITIVE (H): ICD-10-CM

## 2022-12-26 DIAGNOSIS — I97.2 LYMPHEDEMA SYNDROME, POSTMASTECTOMY: Primary | ICD-10-CM

## 2022-12-26 PROCEDURE — 97110 THERAPEUTIC EXERCISES: CPT | Mod: GO | Performed by: OCCUPATIONAL THERAPIST

## 2022-12-26 PROCEDURE — 97035 APP MDLTY 1+ULTRASOUND EA 15: CPT | Mod: GO | Performed by: OCCUPATIONAL THERAPIST

## 2022-12-26 PROCEDURE — 97140 MANUAL THERAPY 1/> REGIONS: CPT | Mod: GO | Performed by: OCCUPATIONAL THERAPIST

## 2022-12-26 PROCEDURE — 97140 MANUAL THERAPY 1/> REGIONS: CPT | Mod: GO

## 2022-12-28 NOTE — PROGRESS NOTES
SOAP note objective information for 1/2/2023    Please refer to the daily flowsheet for treatment today, total treatment time and time spent performing 1:1 timed codes.       Objective:  Pain Level (Scale 0-10):   12/5/2022 12/19/2022 12/26/22 12/26/2022 1/2/2023   At Rest 3 1-2 2-3 2-3 2   With Use 4-8 4-5 5 5 5-6     Pain Description:  Date 12/5/2022 12/19/2022   Location wrist    Pain Quality Aching, Sharp, Shooting and Tender, burning    Frequency constant      Pain is worst  daytime    Exacerbated by  use    Relieved by NSAIDs    Progression worsening improving     Sensation   WNL throughout all nerve distributions; per patient report    Edema   - none  + mild    ++ moderate    +++ severe    12/5/2022 12/19/2022   1st DC +++Left 18.4 cm  Right 21.0 cm ++  Right 21.0 cm   Radial Styloid +++ ++      ROM  Pain Report: - none  + mild    ++ moderate    +++ severe   Thumb 12/5/2022 12/5/2022 12/19/2022    AROM (PROM) Left Right Right    MP +/55 0/55-     IP 0/85 0/85-     RABD 55 42+++ 30++    PABD 45 42+ 42++    Opposition 10 1++        Wrist 12/5/2022 12/5/2022 12/19/2022    AROM (PROM) Left Right Right    Extension 60 50+++ 53++    Flexion 75 65+ 70+    RD 17 15+++ 20+++    UD 40 22+++ 20+++    UD with Th Flex full        Resisted Testing  Pain Report: - none  + mild    ++ moderate    +++ severe    12/5/2022   APL +++   EPB +++   EPL +++   FCR +++     Strength   (Measured in pounds)  NT due pain    Special Tests   Pain Report:  - none    + mild    ++ moderate    +++ severe    12/5/2022   Finkelsteins +++   Radial Nerve Tinel's (DRSN) +++   Ap Joint Laxity of Trapezium NT   Crepitus Present NT   CMC Adduction Stress Test NT   CMC Extension Stress Test NT   WHAT test NT       Neural Tension Testing  RNT: Radial Neurodynamic Test (based on DONALD Armando's ULNT)   12/5/2022   0-5 Scale 1   Position:   0/5: Arm across abdomen in coronal plane  1/5: Depress shoulder, ER to neutral ABD shoulder to 45 degrees  2/5: IR  "shoulder to end range, keep elbow at 90 degrees  3/5: Extend elbow to 0 degrees  4/5: Fully pronate forearm  5/5: Flex wrist and fingers with UD  Notes:    (+) indicates beyond grade level but less than [facility]CI to next level    (-) indicates over FCI to level    S1  onset/change of patient's symptoms    S2 definite stop point based on patient's discomfort level    Palpation  Pain Report:  - none    + mild    ++ moderate    +++ severe    12/5/2022 12/26/2022 1/2/2023   Radial Styloid +++ ++ +   1st DC +++ ++ +   FCR +++ ++ +   Thumb CMC NT +++ +   PIN Site ++ + +   Extensor Wad ++ ++ +   Web space  +++ ++       Treatment Plan:    Modalities:    US   Therapeutic Exercise:    AROM  Neuromuscular re-ed:   Nerve Gliding, Posture and Kinesiotaping  Manual Techniques:   Friction massage and Myofascial release  Orthotic Fabrication:    Static and Forearm based  Self Care:    Self Care Tasks and Ergonomic Considerations    Discharge Plan:  Achieve all LTG.  Independent in home treatment program.  Reach maximal therapeutic benefit.    Discharge Plan:    Achieve all LTG.  Independent in home treatment program.  Reach maximal therapeutic benefit.    Home Exercise Program:  Foam roller massage to scapular area; side upper arm and pec area  Exercise Name: Education Sheet General - Sessions: 1, Notes: perpendicular one direction massage on sore area. Massage from bottom of wrist to top of wrist.  Icing 5-7 x/day, EMR Notes: perpendicular one direction massage on sore area. Massage from bottom of wrist to top of wrist.  Icing 5-7 x/day  Exercise Name: deQuervain Overview - Sessions: 1, Notes: Golf ball massage wrist up to elbow area. 30 sec each trigger point.  Use your fingers to do \"strip\" motion in thumb sided wrist area.  Cold pack for 5-10 min. after massage  Exercise Name: Nerve Gliding Proximal Radial, Sets: 1 - Reps: 5-10 - Sessions: 2-4, Notes: only drop shoulder and slightly bend wrist. No pain, EMR Notes: only drop " shoulder and slightly bend wrist. No pain  Doorway stretch  Vertical mouse  Potential split keyboard    12/19/2022  k-tape (strips and one around wrist)  Cont wear orthosis HS and 1/2 or more day    12/26/2022  Exercise Name: Randy Massage to Thumb - Sessions: 1x/day or every other if sore, Notes: Use a golf ball. Hold on tender spot for 30-60 seconds.  Move to the next spot. Search for tender areas in the entire thumb pad.  Then put golf ball on web space and use a pencil to push down on top of web space, EMR Notes: Use a golf ball. Hold on tender spot for 30-60 seconds.  Move to the next spot. Search for tender areas in the entire thumb pad.  Then put golf ball on web space and use a pencil to push down on top of web space  Exercise Name: Thumb Stabilization Web Space Release Method 1 with Clip - Sessions: 1, Notes: 2-3 minutes  Exercise Name: Thumb Stabilization, Repositioning, Method 1(on chest) - Reps: 1 - Sessions: 1-2, Notes: Hold to the count of 5 or 10, EMR Notes: Hold to the count of 5 or 10    Next Visit:  US  MFR  Nerve gliding  Posture education  Cont discussing computer ergo discussion

## 2023-01-02 ENCOUNTER — THERAPY VISIT (OUTPATIENT)
Dept: OCCUPATIONAL THERAPY | Facility: CLINIC | Age: 73
End: 2023-01-02
Payer: COMMERCIAL

## 2023-01-02 DIAGNOSIS — M25.531 RIGHT WRIST PAIN: Primary | ICD-10-CM

## 2023-01-02 PROCEDURE — 97035 APP MDLTY 1+ULTRASOUND EA 15: CPT | Mod: GO | Performed by: OCCUPATIONAL THERAPIST

## 2023-01-02 PROCEDURE — 97140 MANUAL THERAPY 1/> REGIONS: CPT | Mod: GO | Performed by: OCCUPATIONAL THERAPIST

## 2023-01-09 ENCOUNTER — HOSPITAL ENCOUNTER (OUTPATIENT)
Dept: OCCUPATIONAL THERAPY | Facility: CLINIC | Age: 73
Discharge: HOME | End: 2023-01-09
Payer: COMMERCIAL

## 2023-01-09 ENCOUNTER — THERAPY VISIT (OUTPATIENT)
Dept: OCCUPATIONAL THERAPY | Facility: CLINIC | Age: 73
End: 2023-01-09
Payer: COMMERCIAL

## 2023-01-09 DIAGNOSIS — M25.531 RIGHT WRIST PAIN: Primary | ICD-10-CM

## 2023-01-09 DIAGNOSIS — C50.212 MALIGNANT NEOPLASM OF UPPER-INNER QUADRANT OF LEFT BREAST IN FEMALE, ESTROGEN RECEPTOR POSITIVE (H): ICD-10-CM

## 2023-01-09 DIAGNOSIS — Z17.0 MALIGNANT NEOPLASM OF UPPER-INNER QUADRANT OF LEFT BREAST IN FEMALE, ESTROGEN RECEPTOR POSITIVE (H): ICD-10-CM

## 2023-01-09 DIAGNOSIS — Z17.0 MALIGNANT NEOPLASM OF OVERLAPPING SITES OF RIGHT BREAST IN FEMALE, ESTROGEN RECEPTOR POSITIVE (H): ICD-10-CM

## 2023-01-09 DIAGNOSIS — C50.811 MALIGNANT NEOPLASM OF OVERLAPPING SITES OF RIGHT BREAST IN FEMALE, ESTROGEN RECEPTOR POSITIVE (H): ICD-10-CM

## 2023-01-09 DIAGNOSIS — I97.2 LYMPHEDEMA SYNDROME, POSTMASTECTOMY: Primary | ICD-10-CM

## 2023-01-09 PROCEDURE — 97140 MANUAL THERAPY 1/> REGIONS: CPT | Mod: GO

## 2023-01-09 PROCEDURE — 97110 THERAPEUTIC EXERCISES: CPT | Mod: GO

## 2023-01-09 PROCEDURE — 97140 MANUAL THERAPY 1/> REGIONS: CPT | Mod: GO | Performed by: OCCUPATIONAL THERAPIST

## 2023-01-09 PROCEDURE — 97035 APP MDLTY 1+ULTRASOUND EA 15: CPT | Mod: GO | Performed by: OCCUPATIONAL THERAPIST

## 2023-01-09 NOTE — PROGRESS NOTES
Hand Therapy Progress Note    Current Date:  1/9/2023    Diagnosis: Right wrist pain  DOI: August 2022    Precautions: cancer; lymphodema    Reporting period is 12/5/2022 to 1/9/2023    Subjective:   Subjective changes noted by patient: K-tape really helping.  I am so much better.  I am going back to school in a week.  I am wearing my splint less.  Functional changes noted by patient:  Improvement in Self Care Tasks (dressing, eating, bathing, hygiene/toileting), Work Tasks, Sleep Patterns, Recreational Activities, Household Chores and Driving   Patient has noted adverse reaction to:  None    Functional Outcome Measure:  Upper Extremity Functional Index Score:  SCORE:   Column Totals: /80: 61   (A lower score indicates greater disability.)    Objective:    Pain Level (Scale 0-10):   12/5/2022 12/19/2022 12/26/22 12/26/2022 1/2/2023 1/9/2023   At Rest 3 1-2 2-3 2-3 2 0-1   With Use 4-8 4-5 5 5 5-6 2-3     Pain Description:  Date 12/5/2022 12/19/2022    Location wrist     Pain Quality Aching, Sharp, Shooting and Tender, burning     Frequency constant       Pain is worst  daytime     Exacerbated by  use     Relieved by NSAIDs     Progression worsening improving      Sensation   WNL throughout all nerve distributions; per patient report    Edema   - none  + mild    ++ moderate    +++ severe    12/5/2022 12/19/2022 1/9/2023   1st DC +++Left 18.4 cm  Right 21.0 cm ++  Right 21.0 cm 20.6 cm   Radial Styloid +++ ++       ROM  Pain Report: - none  + mild    ++ moderate    +++ severe   Thumb 12/5/2022 12/5/2022 12/19/2022 1/9/2023   AROM (PROM) Left Right Right Right   MP +/55 0/55-     IP 0/85 0/85-     RABD 55 42+++ 30++ 39+   PABD 45 42+ 42++ 38+   Opposition 10 1++        Wrist 12/5/2022 12/5/2022 12/19/2022 1/9/2023   AROM (PROM) Left Right Right Right   Extension 60 50+++ 53++ 59 min   Flexion 75 65+ 70+ 75+   RD 17 15+++ 20+++ 17++   UD 40 22+++ 20+++ 29++   UD with Th Flex full        Resisted Testing  Pain Report: -  none  + mild    ++ moderate    +++ severe    12/5/2022 1/9/2023   APL +++ ++   EPB +++ -   EPL +++ -   FCR +++ ++     Strength   (Measured in pounds)  NT due pain    Special Tests   Pain Report:  - none    + mild    ++ moderate    +++ severe    12/5/2022 1/9/2023   Finkelsteins +++ ++   Radial Nerve Tinel's (DRSN) +++ minimal   Ap Joint Laxity of Trapezium NT    Crepitus Present NT    CMC Adduction Stress Test NT    CMC Extension Stress Test NT    WHAT test NT        Neural Tension Testing  RNT: Radial Neurodynamic Test (based on DS Prabha's ULNT)   12/5/2022 1/9/2023   0-5 Scale 1 2   Position:   0/5: Arm across abdomen in coronal plane  1/5: Depress shoulder, ER to neutral ABD shoulder to 45 degrees  2/5: IR shoulder to end range, keep elbow at 90 degrees  3/5: Extend elbow to 0 degrees  4/5: Fully pronate forearm  5/5: Flex wrist and fingers with UD  Notes:    (+) indicates beyond grade level but less than FPC to next level    (-) indicates over FPC to level    S1  onset/change of patient's symptoms    S2 definite stop point based on patient's discomfort level    Palpation  Pain Report:  - none    + mild    ++ moderate    +++ severe    12/5/2022 12/26/2022 1/2/2023 1/9/2023   Radial Styloid +++ ++ + mimimal   1st DC +++ ++ + minimal   FCR +++ ++ + ++   Thumb CMC NT +++ + minimal   PIN Site ++ + + -   Extensor Wad ++ ++ + -   Web space  +++ ++ -     Assessment:  Response to therapy has been improvement to:  ROM of Wrist:  All Planes  Thumb:  All Planes  Flexibility:  tendon gliding is improved, improved excursion of involved muscles and less tightness in involved muscles  Strength:  overall  Pain:  frequency is less, intensity of pain is decreased, duration of pain is decreased and less tender over affected area    Overall Assessment:  Patient's symptoms are resolving.  STG/LTG:  STGoals have been reviewed and progress or achievement has occurred;  see goal sheet for details and  "updates.    Plan:  Frequency/Duration:  Recommend continuing to see patient  1 X week, once daily  for 2-3 months   Appropriateness of Rx I have re-evaluated this patient and find that the nature, scope, duration and intensity of the therapy is appropriate for the medical condition of the patient.   Recommendations for Continued Therapy      Treatment Plan:    Modalities:    US   Therapeutic Exercise:    AROM  Neuromuscular re-ed:   Nerve Gliding, Posture and Kinesiotaping  Manual Techniques:   Friction massage and Myofascial release  Orthotic Fabrication:    Static and Forearm based  Self Care:    Self Care Tasks and Ergonomic Considerations    Discharge Plan:  Achieve all LTG.  Independent in home treatment program.  Reach maximal therapeutic benefit.    Discharge Plan:    Achieve all LTG.  Independent in home treatment program.  Reach maximal therapeutic benefit.    Home Exercise Program:  Foam roller massage to scapular area; side upper arm and pec area  Exercise Name: Education Sheet General - Sessions: 1, Notes: perpendicular one direction massage on sore area. Massage from bottom of wrist to top of wrist.  Icing 5-7 x/day, EMR Notes: perpendicular one direction massage on sore area. Massage from bottom of wrist to top of wrist.  Icing 5-7 x/day  Exercise Name: deQuervain Overview - Sessions: 1, Notes: Golf ball massage wrist up to elbow area. 30 sec each trigger point.  Use your fingers to do \"strip\" motion in thumb sided wrist area.  Cold pack for 5-10 min. after massage  Exercise Name: Nerve Gliding Proximal Radial, Sets: 1 - Reps: 5-10 - Sessions: 2-4, Notes: only drop shoulder and slightly bend wrist. No pain, EMR Notes: only drop shoulder and slightly bend wrist. No pain  Doorway stretch  Vertical mouse  Potential split keyboard    12/19/2022  k-tape (strips and one around wrist)  Cont wear orthosis HS and 1/2 or more day    12/26/2022  Exercise Name: [place] Massage to Thumb - Sessions: 1x/day or every " other if sore, Notes: Use a golf ball. Hold on tender spot for 30-60 seconds.  Move to the next spot. Search for tender areas in the entire thumb pad.  Then put golf ball on web space and use a pencil to push down on top of web space, EMR Notes: Use a golf ball. Hold on tender spot for 30-60 seconds.  Move to the next spot. Search for tender areas in the entire thumb pad.  Then put golf ball on web space and use a pencil to push down on top of web space  Exercise Name: Thumb Stabilization Web Space Release Method 1 with Clip - Sessions: 1, Notes: 2-3 minutes  Exercise Name: Thumb Stabilization, Repositioning, Method 1(on chest) - Reps: 1 - Sessions: 1-2, Notes: Hold to the count of 5 or 10, EMR Notes: Hold to the count of 5 or 10    Next Visit:  US  MFR  Nerve gliding  Posture education  Cont discussing computer ergo discussion

## 2023-01-16 ENCOUNTER — THERAPY VISIT (OUTPATIENT)
Dept: OCCUPATIONAL THERAPY | Facility: CLINIC | Age: 73
End: 2023-01-16
Payer: COMMERCIAL

## 2023-01-16 DIAGNOSIS — M25.531 RIGHT WRIST PAIN: Primary | ICD-10-CM

## 2023-01-16 PROCEDURE — 97035 APP MDLTY 1+ULTRASOUND EA 15: CPT | Mod: GO | Performed by: OCCUPATIONAL THERAPIST

## 2023-01-16 PROCEDURE — 97140 MANUAL THERAPY 1/> REGIONS: CPT | Mod: GO | Performed by: OCCUPATIONAL THERAPIST

## 2023-01-16 NOTE — PROGRESS NOTES
SOAP note objective information for 1/16/2023.    Please refer to the daily flowsheet for treatment today, total treatment time and time spent performing 1:1 timed codes.       Objective:    Pain Level (Scale 0-10):   12/5/22 12/19/22 12/26/22 12/26/22 1/2/23 1/9/23 1/16/2023   At Rest 3 1-2 2-3 2-3 2 0-1 0-1   With Use 4-8 4-5 5 5 5-6 2-3 0-2     Pain Description:  Date 12/5/2022 12/19/2022    Location wrist     Pain Quality Aching, Sharp, Shooting and Tender, burning     Frequency constant       Pain is worst  daytime     Exacerbated by  use     Relieved by NSAIDs     Progression worsening improving      Sensation   WNL throughout all nerve distributions; per patient report    Edema   - none  + mild    ++ moderate    +++ severe    12/5/2022 12/19/2022 1/9/2023   1st DC +++Left 18.4 cm  Right 21.0 cm ++  Right 21.0 cm 20.6 cm   Radial Styloid +++ ++       ROM  Pain Report: - none  + mild    ++ moderate    +++ severe   Thumb 12/5/2022 12/5/2022 12/19/2022 1/9/2023   AROM (PROM) Left Right Right Right   MP +/55 0/55-     IP 0/85 0/85-     RABD 55 42+++ 30++ 39+   PABD 45 42+ 42++ 38+   Opposition 10 1++        Wrist 12/5/2022 12/5/2022 12/19/2022 1/9/2023    AROM (PROM) Left Right Right Right    Extension 60 50+++ 53++ 59 min    Flexion 75 65+ 70+ 75+    RD 17 15+++ 20+++ 17++    UD 40 22+++ 20+++ 29++    UD with Th Flex full         Resisted Testing  Pain Report: - none  + mild    ++ moderate    +++ severe    12/5/22 1/9/23    APL +++ ++    EPB +++ -    EPL +++ -    FCR +++ ++      Strength   (Measured in pounds)  NT due pain    Special Tests   Pain Report:  - none    + mild    ++ moderate    +++ severe    12/5/2022 1/9/2023 1/16/2023   Finkelsteins +++ ++ pull   Radial Nerve Tinel's (DRSN) +++ minimal    Ap Joint Laxity of Trapezium NT     Crepitus Present NT     CMC Adduction Stress Test NT     CMC Extension Stress Test NT     WHAT test NT         Neural Tension Testing  RNT: Radial Neurodynamic Test (based on DS  Prabha's ULNT)   12/5/2022 1/9/2023    0-5 Scale 1 2    Position:   0/5: Arm across abdomen in coronal plane  1/5: Depress shoulder, ER to neutral ABD shoulder to 45 degrees  2/5: IR shoulder to end range, keep elbow at 90 degrees  3/5: Extend elbow to 0 degrees  4/5: Fully pronate forearm  5/5: Flex wrist and fingers with UD  Notes:    (+) indicates beyond grade level but less than half-way to next level    (-) indicates over half-way to level    S1  onset/change of patient's symptoms    S2 definite stop point based on patient's discomfort level    Palpation  Pain Report:  - none    + mild    ++ moderate    +++ severe    12/5/22 12/26/22 1/2/23 1/9/23 1/16/23   Radial Styloid +++ ++ + mimimal mimimal   1st DC +++ ++ + minimal mimimal   FCR +++ ++ + ++ -   Thumb CMC NT +++ + minimal mimimal   PIN Site ++ + + -    Extensor Wad ++ ++ + -    Web space  +++ ++ -          Treatment Plan:    Modalities:    US   Therapeutic Exercise:    AROM  Neuromuscular re-ed:   Nerve Gliding, Posture and Kinesiotaping  Manual Techniques:   Friction massage and Myofascial release  Orthotic Fabrication:    Static and Forearm based  Self Care:    Self Care Tasks and Ergonomic Considerations    Discharge Plan:  Achieve all LTG.  Independent in home treatment program.  Reach maximal therapeutic benefit.    Discharge Plan:    Achieve all LTG.  Independent in home treatment program.  Reach maximal therapeutic benefit.    Home Exercise Program:  Foam roller massage to scapular area; side upper arm and pec area  Exercise Name: Education Sheet General - Sessions: 1, Notes: perpendicular one direction massage on sore area. Massage from bottom of wrist to top of wrist.  Icing 5-7 x/day, EMR Notes: perpendicular one direction massage on sore area. Massage from bottom of wrist to top of wrist.  Icing 5-7 x/day  Exercise Name: deQuervain Overview - Sessions: 1, Notes: Golf ball massage wrist up to elbow area. 30 sec each trigger point.  Use your  "fingers to do \"strip\" motion in thumb sided wrist area.  Cold pack for 5-10 min. after massage  Exercise Name: Nerve Gliding Proximal Radial, Sets: 1 - Reps: 5-10 - Sessions: 2-4, Notes: only drop shoulder and slightly bend wrist. No pain, EMR Notes: only drop shoulder and slightly bend wrist. No pain  Doorway stretch  Vertical mouse  Potential split keyboard    12/19/2022  k-tape (strips and one around wrist)  Cont wear orthosis HS and 1/2 or more day    12/26/2022  Exercise Name: [place] Massage to Thumb - Sessions: 1x/day or every other if sore, Notes: Use a golf ball. Hold on tender spot for 30-60 seconds.  Move to the next spot. Search for tender areas in the entire thumb pad.  Then put golf ball on web space and use a pencil to push down on top of web space, EMR Notes: Use a golf ball. Hold on tender spot for 30-60 seconds.  Move to the next spot. Search for tender areas in the entire thumb pad.  Then put golf ball on web space and use a pencil to push down on top of web space  Exercise Name: Thumb Stabilization Web Space Release Method 1 with Clip - Sessions: 1, Notes: 2-3 minutes  Exercise Name: Thumb Stabilization, Repositioning, Method 1(on chest) - Reps: 1 - Sessions: 1-2, Notes: Hold to the count of 5 or 10, EMR Notes: Hold to the count of 5 or 10    Next Visit:  US  MFR  Nerve gliding  Posture education  Cont discussing computer ergo discussion          "

## 2023-01-23 ENCOUNTER — THERAPY VISIT (OUTPATIENT)
Dept: OCCUPATIONAL THERAPY | Facility: CLINIC | Age: 73
End: 2023-01-23
Payer: COMMERCIAL

## 2023-01-23 DIAGNOSIS — M25.531 RIGHT WRIST PAIN: Primary | ICD-10-CM

## 2023-01-23 PROCEDURE — 97140 MANUAL THERAPY 1/> REGIONS: CPT | Mod: GO | Performed by: OCCUPATIONAL THERAPIST

## 2023-01-23 PROCEDURE — 97035 APP MDLTY 1+ULTRASOUND EA 15: CPT | Mod: GO | Performed by: OCCUPATIONAL THERAPIST

## 2023-01-23 NOTE — PROGRESS NOTES
SOAP note objective information for 1/23/2023.    Please refer to the daily flowsheet for treatment today, total treatment time and time spent performing 1:1 timed codes.       Objective:    Pain Level (Scale 0-10):   12/5/22 12/19/22 12/26/22 12/26/22 1/2/23 1/9/23 1/16/2023 1/23/2023   At Rest 3 1-2 2-3 2-3 2 0-1 0-1 0-1   With Use 4-8 4-5 5 5 5-6 2-3 0-2 0-2     Pain Description:  Date 12/5/2022 12/19/2022    Location wrist     Pain Quality Aching, Sharp, Shooting and Tender, burning     Frequency constant       Pain is worst  daytime     Exacerbated by  use     Relieved by NSAIDs     Progression worsening improving      Sensation   WNL throughout all nerve distributions; per patient report    Edema   - none  + mild    ++ moderate    +++ severe    12/5/2022 12/19/2022 1/9/2023   1st DC +++Left 18.4 cm  Right 21.0 cm ++  Right 21.0 cm 20.6 cm   Radial Styloid +++ ++       ROM  Pain Report: - none  + mild    ++ moderate    +++ severe   Thumb 12/5/2022 12/5/2022 12/19/2022 1/9/2023    AROM (PROM) Left Right Right Right    MP +/55 0/55-      IP 0/85 0/85-      RABD 55 42+++ 30++ 39+    PABD 45 42+ 42++ 38+    Opposition 10 1++         Wrist 12/5/2022 12/5/2022 12/19/2022 1/9/2023 1/23/2023   AROM (PROM) Left Right Right Right Right   Extension 60 50+++ 53++ 59 min 60-   Flexion 75 65+ 70+ 75+ 75-   RD 17 15+++ 20+++ 17++ 15+   UD 40 22+++ 20+++ 29++ 25++   UD with Th Flex full         Resisted Testing  Pain Report: - none  + mild    ++ moderate    +++ severe    12/5/22 1/9/23    APL +++ ++    EPB +++ -    EPL +++ -    FCR +++ ++      Strength   (Measured in pounds)  NT due pain    Special Tests   Pain Report:  - none    + mild    ++ moderate    +++ severe    12/5/2022 1/9/2023 1/16/2023   Finkelsteins +++ ++ pull   Radial Nerve Tinel's (DRSN) +++ minimal    Ap Joint Laxity of Trapezium NT     Crepitus Present NT     CMC Adduction Stress Test NT     CMC Extension Stress Test NT     WHAT test NT         Neural Tension  Testing  RNT: Radial Neurodynamic Test (based on DS Prabha's ULNT)   12/5/2022 1/9/2023    0-5 Scale 1 2    Position:   0/5: Arm across abdomen in coronal plane  1/5: Depress shoulder, ER to neutral ABD shoulder to 45 degrees  2/5: IR shoulder to end range, keep elbow at 90 degrees  3/5: Extend elbow to 0 degrees  4/5: Fully pronate forearm  5/5: Flex wrist and fingers with UD  Notes:    (+) indicates beyond grade level but less than FPC to next level    (-) indicates over FPC to level    S1  onset/change of patient's symptoms    S2 definite stop point based on patient's discomfort level    Palpation  Pain Report:  - none    + mild    ++ moderate    +++ severe    12/5/22 12/26/22 1/2/23 1/9/23 1/16/23 1/23/23   Radial Styloid +++ ++ + mimimal mimimal minimal   1st DC +++ ++ + minimal mimimal minimal   FCR +++ ++ + ++ - minimal   Thumb CMC NT +++ + minimal mimimal -   PIN Site ++ + + -     Extensor Wad ++ ++ + -     Web space  +++ ++ -           Treatment Plan:    Modalities:    US   Therapeutic Exercise:    AROM  Neuromuscular re-ed:   Nerve Gliding, Posture and Kinesiotaping  Manual Techniques:   Friction massage and Myofascial release  Orthotic Fabrication:    Static and Forearm based  Self Care:    Self Care Tasks and Ergonomic Considerations    Discharge Plan:  Achieve all LTG.  Independent in home treatment program.  Reach maximal therapeutic benefit.    Discharge Plan:    Achieve all LTG.  Independent in home treatment program.  Reach maximal therapeutic benefit.    Home Exercise Program:  Foam roller massage to scapular area; side upper arm and pec area  Exercise Name: Education Sheet General - Sessions: 1, Notes: perpendicular one direction massage on sore area. Massage from bottom of wrist to top of wrist.  Icing 5-7 x/day, EMR Notes: perpendicular one direction massage on sore area. Massage from bottom of wrist to top of wrist.  Icing 5-7 x/day  Exercise Name: deQuervain Overview - Sessions: 1,  "Notes: Golf ball massage wrist up to elbow area. 30 sec each trigger point.  Use your fingers to do \"strip\" motion in thumb sided wrist area.  Cold pack for 5-10 min. after massage  Exercise Name: Nerve Gliding Proximal Radial, Sets: 1 - Reps: 5-10 - Sessions: 2-4, Notes: only drop shoulder and slightly bend wrist. No pain, EMR Notes: only drop shoulder and slightly bend wrist. No pain  Doorway stretch  Vertical mouse  Potential split keyboard    12/19/2022  k-tape (strips and one around wrist)  Cont wear orthosis HS and 1/2 or more day    12/26/2022  Exercise Name: [place] Massage to Thumb - Sessions: 1x/day or every other if sore, Notes: Use a golf ball. Hold on tender spot for 30-60 seconds.  Move to the next spot. Search for tender areas in the entire thumb pad.  Then put golf ball on web space and use a pencil to push down on top of web space, EMR Notes: Use a golf ball. Hold on tender spot for 30-60 seconds.  Move to the next spot. Search for tender areas in the entire thumb pad.  Then put golf ball on web space and use a pencil to push down on top of web space  Exercise Name: Thumb Stabilization Web Space Release Method 1 with Clip - Sessions: 1, Notes: 2-3 minutes  Exercise Name: Thumb Stabilization, Repositioning, Method 1(on chest) - Reps: 1 - Sessions: 1-2, Notes: Hold to the count of 5 or 10, EMR Notes: Hold to the count of 5 or 10    Next Visit:  US  MFR  Nerve gliding  Posture education  Cont discussing computer ergo discussion          "

## 2023-01-30 ENCOUNTER — HOSPITAL ENCOUNTER (OUTPATIENT)
Dept: OCCUPATIONAL THERAPY | Facility: CLINIC | Age: 73
Discharge: HOME | End: 2023-01-30
Payer: COMMERCIAL

## 2023-01-30 DIAGNOSIS — Z17.0 MALIGNANT NEOPLASM OF UPPER-INNER QUADRANT OF LEFT BREAST IN FEMALE, ESTROGEN RECEPTOR POSITIVE (H): ICD-10-CM

## 2023-01-30 DIAGNOSIS — M25.531 RIGHT WRIST PAIN: Primary | ICD-10-CM

## 2023-01-30 DIAGNOSIS — C50.811 MALIGNANT NEOPLASM OF OVERLAPPING SITES OF RIGHT BREAST IN FEMALE, ESTROGEN RECEPTOR POSITIVE (H): ICD-10-CM

## 2023-01-30 DIAGNOSIS — I97.2 LYMPHEDEMA SYNDROME, POSTMASTECTOMY: ICD-10-CM

## 2023-01-30 DIAGNOSIS — C50.212 MALIGNANT NEOPLASM OF UPPER-INNER QUADRANT OF LEFT BREAST IN FEMALE, ESTROGEN RECEPTOR POSITIVE (H): ICD-10-CM

## 2023-01-30 DIAGNOSIS — Z17.0 MALIGNANT NEOPLASM OF OVERLAPPING SITES OF RIGHT BREAST IN FEMALE, ESTROGEN RECEPTOR POSITIVE (H): ICD-10-CM

## 2023-01-30 DIAGNOSIS — G56.31 LESION OF RIGHT RADIAL NERVE: ICD-10-CM

## 2023-01-30 PROCEDURE — 97140 MANUAL THERAPY 1/> REGIONS: CPT | Mod: GO

## 2023-02-13 ENCOUNTER — THERAPY VISIT (OUTPATIENT)
Dept: OCCUPATIONAL THERAPY | Facility: CLINIC | Age: 73
End: 2023-02-13
Payer: COMMERCIAL

## 2023-02-13 DIAGNOSIS — M25.531 RIGHT WRIST PAIN: Primary | ICD-10-CM

## 2023-02-13 PROCEDURE — 97035 APP MDLTY 1+ULTRASOUND EA 15: CPT | Mod: GO | Performed by: OCCUPATIONAL THERAPIST

## 2023-02-13 PROCEDURE — 97140 MANUAL THERAPY 1/> REGIONS: CPT | Mod: GO | Performed by: OCCUPATIONAL THERAPIST

## 2023-02-13 NOTE — PROGRESS NOTES
Hand Therapy Progress Note    Current Date:  2/13/2023    Diagnosis: Right wrist pain  DOI: August 2022    Precautions: cancer; lymphodema    Reporting period is 1/9/2023 to 2/13/2023    Subjective:   Subjective changes noted by patient:  Less achy and sharp pain.  I am having an easier time with all activities.  Functional changes noted by patient:  Improvement in Self Care Tasks (dressing, eating, bathing, hygiene/toileting), Work Tasks, Sleep Patterns, Recreational Activities, Household Chores and Driving   Patient has noted adverse reaction to:  None    Functional Outcome Measure:  Upper Extremity Functional Index Score:  SCORE:   Column Totals: /80: 65   (A lower score indicates greater disability.)    Objective:    Pain Level (Scale 0-10):   12/5/22 12/19/22 12/26/22 12/26/22 1/2/23 1/9/23 1/16/2023 1/23/2023 2/13/2023   At Rest 3 1-2 2-3 2-3 2 0-1 0-1 0-1 0   With Use 4-8 4-5 5 5 5-6 2-3 0-2 0-2 0-1     Pain Description:  Date 12/5/2022 12/19/2022    Location wrist     Pain Quality Aching, Sharp, Shooting and Tender, burning     Frequency constant       Pain is worst  daytime     Exacerbated by  use     Relieved by NSAIDs     Progression worsening improving      Sensation   WNL throughout all nerve distributions; per patient report    Edema   - none  + mild    ++ moderate    +++ severe    12/5/2022 12/19/2022 1/9/2023   1st DC +++Left 18.4 cm  Right 21.0 cm ++  Right 21.0 cm 20.6 cm   Radial Styloid +++ ++       ROM  Pain Report: - none  + mild    ++ moderate    +++ severe   Thumb 12/5/2022 12/5/2022 12/19/2022 1/9/2023 2/13/2023   AROM (PROM) Left Right Right Right Right   MP +/55 0/55-      IP 0/85 0/85-      RABD 55 42+++ 30++ 39+ 45-   PABD 45 42+ 42++ 38+ 38+   Opposition 10 1++         Wrist 12/5/2022 12/5/2022 12/19/2022 1/9/2023 1/23/2023 2/13/2023   AROM (PROM) Left Right Right Right Right Right   Extension 60 50+++ 53++ 59 min 60- 60-   Flexion 75 65+ 70+ 75+ 75- 75-   RD 17 15+++ 20+++ 17++ 15+ 19+    UD 40 22+++ 20+++ 29++ 25++ 37+   UD with Th Flex full          Resisted Testing  Pain Report: - none  + mild    ++ moderate    +++ severe    12/5/22 1/9/23 2/13/23   APL +++ ++ ++   EPB +++ - -   EPL +++ - -   FCR +++ ++ + to ++     Strength   (Measured in pounds)  NT due pain    Special Tests   Pain Report:  - none    + mild    ++ moderate    +++ severe    12/5/2022 1/9/2023 1/16/2023   Finkelsteins +++ ++ pull   Radial Nerve Tinel's (DRSN) +++ minimal    Ap Joint Laxity of Trapezium NT     Crepitus Present NT     CMC Adduction Stress Test NT     CMC Extension Stress Test NT     WHAT test NT         Neural Tension Testing  RNT: Radial Neurodynamic Test (based on DS Prabha's ULNT)   12/5/2022 1/9/2023 2/13/2023   0-5 Scale 1 2 3   Position:   0/5: Arm across abdomen in coronal plane  1/5: Depress shoulder, ER to neutral ABD shoulder to 45 degrees  2/5: IR shoulder to end range, keep elbow at 90 degrees  3/5: Extend elbow to 0 degrees  4/5: Fully pronate forearm  5/5: Flex wrist and fingers with UD  Notes:    (+) indicates beyond grade level but less than penitentiary to next level    (-) indicates over penitentiary to level    S1  onset/change of patient's symptoms    S2 definite stop point based on patient's discomfort level    Palpation  Pain Report:  - none    + mild    ++ moderate    +++ severe    12/5/22 12/26/22 1/2/23 1/9/23 1/16/23 1/23/23 2/13/23   Radial Styloid +++ ++ + mimimal mimimal minimal +   1st DC +++ ++ + minimal mimimal minimal +   FCR +++ ++ + ++ - minimal +   Thumb CMC NT +++ + minimal mimimal -    PIN Site ++ + + -      Extensor Wad ++ ++ + -      Web space  +++ ++ -        Assessment:  Response to therapy has been improvement to:  ROM of Wrist:  All Planes  Thumb:  All Planes  Flexibility:  tendon gliding is improved, improved excursion of involved muscles and less tightness in involved muscles  Pain:  frequency is less, intensity of pain is decreased, duration of pain is decreased and less tender  "over affected area    Overall Assessment:  Patient's symptoms are resolving.  STG/LTG:  STGoals have been reviewed and progress or achievement has occurred;  see goal sheet for details and updates.    Plan:  Frequency/Duration:  Recommend continuing to see patient  1 X week, once daily  for 4 visits and then wean to every other week for 1 month.  Appropriateness of Rx I have re-evaluated this patient and find that the nature, scope, duration and intensity of the therapy is appropriate for the medical condition of the patient.  Recommendations for Continued Therapy    Treatment Plan:    Modalities:    US   Therapeutic Exercise:    AROM  Neuromuscular re-ed:   Nerve Gliding, Posture and Kinesiotaping  Manual Techniques:   Friction massage and Myofascial release  Orthotic Fabrication:    Static and Forearm based  Self Care:    Self Care Tasks and Ergonomic Considerations    Discharge Plan:  Achieve all LTG.  Independent in home treatment program.  Reach maximal therapeutic benefit.    Discharge Plan:    Achieve all LTG.  Independent in home treatment program.  Reach maximal therapeutic benefit.    Home Exercise Program:  Foam roller massage to scapular area; side upper arm and pec area  Exercise Name: Education Sheet General - Sessions: 1, Notes: perpendicular one direction massage on sore area. Massage from bottom of wrist to top of wrist.  Icing 5-7 x/day, EMR Notes: perpendicular one direction massage on sore area. Massage from bottom of wrist to top of wrist.  Icing 5-7 x/day  Exercise Name: deQuervain Overview - Sessions: 1, Notes: Golf ball massage wrist up to elbow area. 30 sec each trigger point.  Use your fingers to do \"strip\" motion in thumb sided wrist area.  Cold pack for 5-10 min. after massage  Exercise Name: Nerve Gliding Proximal Radial, Sets: 1 - Reps: 5-10 - Sessions: 2-4, Notes: only drop shoulder and slightly bend wrist. No pain, EMR Notes: only drop shoulder and slightly bend wrist. No " pain  Doorway stretch  Vertical mouse  Potential split keyboard    12/19/2022  k-tape (strips and one around wrist)  Cont wear orthosis HS and 1/2 or more day    12/26/2022  Exercise Name: Bard Massage to Thumb - Sessions: 1x/day or every other if sore, Notes: Use a golf ball. Hold on tender spot for 30-60 seconds.  Move to the next spot. Search for tender areas in the entire thumb pad.  Then put golf ball on web space and use a pencil to push down on top of web space, EMR Notes: Use a golf ball. Hold on tender spot for 30-60 seconds.  Move to the next spot. Search for tender areas in the entire thumb pad.  Then put golf ball on web space and use a pencil to push down on top of web space  Exercise Name: Thumb Stabilization Web Space Release Method 1 with Clip - Sessions: 1, Notes: 2-3 minutes  Exercise Name: Thumb Stabilization, Repositioning, Method 1(on chest) - Reps: 1 - Sessions: 1-2, Notes: Hold to the count of 5 or 10, EMR Notes: Hold to the count of 5 or 10    2/13/2023  Massage while also stretching (FCR; APL; EPB)    Next Visit:  US  MFR  Nerve gliding  Posture education  Cont discussing computer ergo discussion

## 2023-02-15 ENCOUNTER — HOSPITAL ENCOUNTER (OUTPATIENT)
Dept: OCCUPATIONAL THERAPY | Facility: CLINIC | Age: 73
Discharge: HOME | End: 2023-02-15
Payer: COMMERCIAL

## 2023-02-15 DIAGNOSIS — Z17.0 MALIGNANT NEOPLASM OF UPPER-INNER QUADRANT OF LEFT BREAST IN FEMALE, ESTROGEN RECEPTOR POSITIVE (H): ICD-10-CM

## 2023-02-15 DIAGNOSIS — Z17.0 MALIGNANT NEOPLASM OF OVERLAPPING SITES OF RIGHT BREAST IN FEMALE, ESTROGEN RECEPTOR POSITIVE (H): ICD-10-CM

## 2023-02-15 DIAGNOSIS — C50.212 MALIGNANT NEOPLASM OF UPPER-INNER QUADRANT OF LEFT BREAST IN FEMALE, ESTROGEN RECEPTOR POSITIVE (H): ICD-10-CM

## 2023-02-15 DIAGNOSIS — I97.2 LYMPHEDEMA SYNDROME, POSTMASTECTOMY: Primary | ICD-10-CM

## 2023-02-15 DIAGNOSIS — C50.811 MALIGNANT NEOPLASM OF OVERLAPPING SITES OF RIGHT BREAST IN FEMALE, ESTROGEN RECEPTOR POSITIVE (H): ICD-10-CM

## 2023-02-15 PROCEDURE — 97140 MANUAL THERAPY 1/> REGIONS: CPT | Mod: GO

## 2023-02-24 NOTE — ED AVS SNAPSHOT
Emergency Department    [address] [facility] [address]    Phone:  [phone]    Fax:  [phone]                                       Janelle Kolb   MRN: [id number]    Department:   Emergency Department   Date of Visit:  2/4/2017           Patient Information     Date Of Birth          1950        Your diagnoses for this visit were:     Trimalleolar fracture of ankle, closed, left, initial encounter        You were seen by Vanessa Iniguez PA-C and Alfa Franco MD.      Follow-up Information     Schedule an appointment as soon as possible for a visit with Matt Lerma MD.    Specialty:  Orthopedics    Why:  this up copming week in 3-5 days for a recheck of your ankle fracture    Contact information:    [facility]    [address] [facility] [address]  [phone]          Discharge Instructions         Ankle Fracture    You have an ankle fracture. This means that one or more of the bones that make up the ankle joint are broken. This causes pain, swelling, and sometimes bruising.  A fracture is treated with a splint or cast or special boot. It will take about 4 to 6 weeks for the fracture to heal. Surgery may be needed to fix severe injuries.  Home care    You will be given a splint, cast or boot to prevent movement at the ankle joint. Unless you were told otherwise, use crutches or a walker. Don t weight on the injured leg until cleared by your healthcare provider to do so. Crutches and walkers can be rented at many pharmacies and surgical or orthopedic supply stores. Don t put weight on a splint. It will break.    Keep your leg elevated to reduce pain and swelling. When sleeping, place a pillow under the injured leg. When sitting, support the injured leg so it is level with your waist. This is very important during the first 48 hours.    Apply an ice pack over the injured area for no more than 15 to 20 minutes.  Start the antibiotic as prescribed today.  Continue to keep area clean and dry.  Change the dressing to the outer right breast at least every 24 hours and if it becomes wet or soiled.      Use the diflucan only if you develop symptoms of a vaginal yeast infection.    Follow up with your PCP, this clinic, ER or Urgent Care if your condition worsens in any way.    Do this every 3 to 6 hours for the first 24 to 48 hours. Continue with ice packs 3 to 4 times a day for the next 2 days, then as needed to ease pain and swelling. To make an ice pack, put ice cubes in a plastic bag that seals at the top. Wrap the bag in a clean, thin towel or cloth. Never put ice or an ice pack directly on the skin. You can place the ice pack directly over the cast or splint. As the ice melts, be careful that the cast or splint doesn t get wet.    Keep the cast, splint, or boot completely dry at all times. Bathe with your cast, splint, or boot out of the water, protected with 2 large plastic bags. Place 1 bag outside of the other. Tape each bag with duct tape at the top end. Water can still leak in. So it's best to keep the cast, splint, or boot away from water. If a boot or fiberglass cast or splint gets wet, dry it with a hair dryer on a cool setting.    You may use over-the-counter pain medicine to control pain, unless another pain medicine was prescribed. Talk with your provider beforeusing these medicines if you have chronic liver or kidney disease or ever had a stomach ulcer or GI bleeding.  Follow-up care  Follow up with your healthcare provider in 1 week, or as advised. This is to be sure the bone is healing properly. If you were given a splint, it may be changed to a cast at your follow-up visit.  If X-rays were taken, you will be told of any new findings that may affect your care.  When to seek medical advice  Call your healthcare provider right away if any of these occur:    The plaster cast or splint becomes wet or soft    The fiberglass cast or splint stays wet for more than 24 hours    There is increased tightness, sore areas, or pain under the cast or splint    Your toes become swollen, cold, blue, numb, or tingly    The cast becomes loose    The cast has a bad smell    The cast develops cracks or breaks     3767-1740 [employer]. [address] [place] [address], [place]  [address]. All rights reserved. This information is not intended as a substitute for professional medical care. Always follow your healthcare professional's instructions.      Discharge Instructions  Splint Care    You had a splint put on today to help protect your injury and help it heal.  Splints are used to treat things like strains, sprains, cuts and fractures (broken bones).    Be sure your splint is not too tight!  If you splint is too tight, it may cause loss of blood supply.  Signs of your splint being too tight include:  your arm or leg hurting a lot more; your fingers or toes getting numb, cold, pale or blue; or your child is crying, fussing or seeming restless.    Return to the Emergency Department right away if:    You have increased pain or pressure around the injury.    You have numbness, tingling, or cool, pale, or blue toes or fingers past the injury.    Your child is more fussy than normal, crying a lot, or restless.    Your splint becomes soft, breaks, or is wet.    Your splint begins to smell bad.    Your splint is cutting into your skin.    Home care:    Keep the injured area above the level of your heart while laying or sitting down.  This will help decrease the swelling and the pain.    Keep the splint dry.    Do not put objects down or inside the splint.    If there is an elastic bandage (Ace  wrap) holding the splint on this may be loosened slightly to relieve pressure or pain.  If pain continues return to the Emergency Department right away.    Do not remove your splint by yourself unless told to by your doctor.    Follow-up:  Sometimes the splint put on in the Emergency Department needs to be changed once the swelling has gone down and a more permanent cast needs to be placed.  This is usually done by a bone specialist doctor (Orthopedist).  Follow the instructions given to you by your doctor today.    X-rays:  X-rays done today were read by your doctor but will also be read by a radiologist.   We will contact you if the radiologist sees anything different on the x-ray.  Your regular doctor may also want to review your x-rays on follow-up.    You could have a fracture (break), even if we told you your x-rays were normal. X-rays are not always certain, and some fractures are hard to see and may not show up right away.  Also, your x-ray may look like you have a fracture, even though you do not.  It is important to follow-up with your regular doctor.     If you were given a prescription for medicine here today, be sure to read all of the information (including the package insert) that comes with your prescription.  This will include important information about the medicine, its side effects, and any warnings that you need to know about.  The pharmacist who fills the prescription can provide more information and answer questions you may have about the medicine.  If you have questions or concerns that the pharmacist cannot address, please call or return to the Emergency Department.   Opioid Medication Information    Pain medications are among the most commonly prescribed medicines, so we are including this information for all our patients. If you did not receive pain medication or get a prescription for pain medicine, you can ignore it.     You may have been given a prescription for an opioid (narcotic) pain medicine and/or have received a pain medicine while here in the Emergency Department. These medicines can make you drowsy or impaired. You must not drive, operate dangerous equipment, or engage in any other dangerous activities while taking these medications. If you drive while taking these medications, you could be arrested for DUI, or driving under the influence. Do not drink any alcohol while you are taking these medications.     Opioid pain medications can cause addiction. If you have a history of chemical dependency of any type, you are at a higher risk of becoming addicted to pain medications.  Only  take these prescribed medications to treat your pain when all other options have been tried. Take it for as short a time and as few doses as possible. Store your pain pills in a secure place, as they are frequently stolen and provide a dangerous opportunity for children or visitors in your house to start abusing these powerful medications. We will not replace any lost or stolen medicine.  As soon as your pain is better, you should flush all your remaining medication.     Many prescription pain medications contain Tylenol  (acetaminophen), including Vicodin , Tylenol #3 , Norco , Lortab , and Percocet .  You should not take any extra pills of Tylenol  if you are using these prescription medications or you can get very sick.  Do not ever take more than 3000 mg of acetaminophen in any 24 hour period.    All opioids tend to cause constipation. Drink plenty of water and eat foods that have a lot of fiber, such as fruits, vegetables, prune juice, apple juice and high fiber cereal.  Take a laxative if you don t move your bowels at least every other day. Miralax , Milk of Magnesia, Colace , or Senna  can be used to keep you regular.      Remember that you can always come back to the Emergency Department if you are not able to see your regular doctor in the amount of time listed above, if you get any new symptoms, or if there is anything that worries you.    24 Hour Appointment Hotline       To make an appointment at any [facility], call [phone] ([phone]). If you don't have a family doctor or clinic, we will help you find one. [facility] clinics are conveniently located to serve the needs of you and your family.             Review of your medicines      Our records show that you are taking the medicines listed below. If these are incorrect, please call your family doctor or clinic.        Dose / Directions Last dose taken    CO Q 10 PO   Dose:  100 mg        Take 100 mg by mouth   Refills:  0         ESTRADIOL 0.1MG/GM CREAM   Quantity:  30 g        Insert 1 gram vaginally 2-3 times per week   Refills:  6        FEMARA 2.5 MG tablet   Dose:  2.5 mg   Generic drug:  letrozole        Take 2.5 mg by mouth daily   Refills:  0        ibuprofen 200 MG tablet   Commonly known as:  ADVIL/MOTRIN   Dose:  600 mg        Take 600 mg by mouth as needed.   Refills:  0        metoprolol 50 MG tablet   Commonly known as:  LOPRESSOR   Dose:  50 mg        Take 50 mg by mouth daily.   Refills:  0        triamterene-hydrochlorothiazide 75-50 MG per tablet   Commonly known as:  MAXZIDE   Dose:  1 tablet        Take 1 tablet by mouth. Patient reports she has only been taking when she notices edema in her legs/ankles. Her prescription states to take 1 tablet po once daily.   Refills:  0        vitamin D 1000 UNITS capsule   Dose:  1 capsule        Take 1 capsule by mouth daily 5, 000 iu   Refills:  0                Procedures and tests performed during your visit     Alcohol ethyl    Ankle XR, G/E 3 views, left    XR Ankle Left G/E 3 Views      Orders Needing Specimen Collection     None      Pending Results     Date and Time Order Name Status Description    2/4/2017 2317 Ankle XR, G/E 3 views, left Preliminary             Pending Culture Results     No orders found for last 2 day(s).       Test Results from your hospital stay           2/4/2017 10:06 PM - Interface, Radiant Ib      Narrative     LEFT ANKLE THREE OR MORE VIEWS   2/4/2017 9:07 PM     HISTORY: Fall/ankle pain.    COMPARISON: None.    FINDINGS: Trimalleolar fracture with moderate displacement of each of  the fracture fragments and disruption of the left ankle mortise. Soft  tissue swelling. Calcaneal spurs.        Impression     IMPRESSION: Trimalleolar fracture left ankle.     BABAK PICKENS MD         2/4/2017 10:47 PM - Interface, Flexilab Results      Component Results     Component Value Ref Range & Units Status    Ethanol g/dL 0.16 (H) <0.01 g/dL Final          2/5/2017 12:11 AM - Interface, Radiant Ib      Narrative     LEFT ANKLE 2 VIEWS   2/4/2017 11:53 PM     HISTORY: Post reduction.    COMPARISON: Left ankle radiograph performed earlier today.        Impression     IMPRESSION: Previously noted displaced comminuted trimalleolar  fracture of the left ankle has been reduced. Alignment of fracture  fragments has improved since the previous exam. Bony detail is  obscured by overlying casting material.                Clinical Quality Measure: Blood Pressure Screening     Your blood pressure was checked while you were in the emergency department today. The last reading we obtained was  BP: 145/73 mmHg . Please read the guidelines below about what these numbers mean and what you should do about them.  If your systolic blood pressure (the top number) is less than 120 and your diastolic blood pressure (the bottom number) is less than 80, then your blood pressure is normal. There is nothing more that you need to do about it.  If your systolic blood pressure (the top number) is 120-139 or your diastolic blood pressure (the bottom number) is 80-89, your blood pressure may be higher than it should be. You should have your blood pressure rechecked within a year by a primary care provider.  If your systolic blood pressure (the top number) is 140 or greater or your diastolic blood pressure (the bottom number) is 90 or greater, you may have high blood pressure. High blood pressure is treatable, but if left untreated over time it can put you at risk for heart attack, stroke, or kidney failure. You should have your blood pressure rechecked by a primary care provider within the next 4 weeks.  If your provider in the emergency department today gave you specific instructions to follow-up with your doctor or provider even sooner than that, you should follow that instruction and not wait for up to 4 weeks for your follow-up visit.        Thank you for choosing [facility]       Thank you for  "choosing [facility] for your care. Our goal is always to provide you with excellent care. Hearing back from our patients is one way we can continue to improve our services. Please take a few minutes to complete the written survey that you may receive in the mail after you visit with us. Thank you!        [facility] Ass[employer] Information     [employer] lets you send messages to your doctor, view your test results, renew your prescriptions, schedule appointments and more. To sign up, go to [email or web address] . Click on \"Log in\" on the left side of the screen, which will take you to the Welcome page. Then click on \"Sign up Now\" on the right side of the page.     You will be asked to enter the access code listed below, as well as some personal information. Please follow the directions to create your username and password.     Your access code is: [email or web address]  Expires: 2017 12:35 AM     Your access code will  in 90 days. If you need help or a new code, please call your [facility] clinic or [phone].        Care EveryWhere ID     This is your Care EveryWhere ID. This could be used by other organizations to access your [facility] medical records  [id number]        After Visit Summary       This is your record. Keep this with you and show to your community pharmacist(s) and doctor(s) at your next visit.                  "

## 2023-02-27 ENCOUNTER — HOSPITAL ENCOUNTER (OUTPATIENT)
Dept: OCCUPATIONAL THERAPY | Facility: CLINIC | Age: 73
Discharge: HOME | End: 2023-02-27
Payer: COMMERCIAL

## 2023-02-27 PROCEDURE — 97140 MANUAL THERAPY 1/> REGIONS: CPT | Mod: GO

## 2023-03-01 ENCOUNTER — THERAPY VISIT (OUTPATIENT)
Dept: OCCUPATIONAL THERAPY | Facility: CLINIC | Age: 73
End: 2023-03-01
Payer: COMMERCIAL

## 2023-03-01 DIAGNOSIS — M25.531 RIGHT WRIST PAIN: Primary | ICD-10-CM

## 2023-03-01 PROCEDURE — 97140 MANUAL THERAPY 1/> REGIONS: CPT | Mod: GO | Performed by: OCCUPATIONAL THERAPIST

## 2023-03-01 PROCEDURE — 97112 NEUROMUSCULAR REEDUCATION: CPT | Mod: GO | Performed by: OCCUPATIONAL THERAPIST

## 2023-03-01 PROCEDURE — 97035 APP MDLTY 1+ULTRASOUND EA 15: CPT | Mod: GO | Performed by: OCCUPATIONAL THERAPIST

## 2023-03-01 NOTE — PROGRESS NOTES
SOAP note objective information for 3/1/2023.    Please refer to the daily flowsheet for treatment today, total treatment time and time spent performing 1:1 timed codes.         Objective:    Pain Level (Scale 0-10):   12/5/22 12/19/22 12/26/22 12/26/22 1/2/23 1/9/23 1/16/2023 1/23/2023 2/13/2023 3/1/2023   At Rest 3 1-2 2-3 2-3 2 0-1 0-1 0-1 0 0   With Use 4-8 4-5 5 5 5-6 2-3 0-2 0-2 0-1 0-1     Pain Description:  Date 12/5/2022 12/19/2022    Location wrist     Pain Quality Aching, Sharp, Shooting and Tender, burning     Frequency constant       Pain is worst  daytime     Exacerbated by  use     Relieved by NSAIDs     Progression worsening improving      Sensation   WNL throughout all nerve distributions; per patient report    Edema   - none  + mild    ++ moderate    +++ severe    12/5/2022 12/19/2022 1/9/2023   1st DC +++Left 18.4 cm  Right 21.0 cm ++  Right 21.0 cm 20.6 cm   Radial Styloid +++ ++       ROM  Pain Report: - none  + mild    ++ moderate    +++ severe   Thumb 12/5/2022 12/5/2022 12/19/2022 1/9/2023 2/13/2023    AROM (PROM) Left Right Right Right Right    MP +/55 0/55-       IP 0/85 0/85-       RABD 55 42+++ 30++ 39+ 45-    PABD 45 42+ 42++ 38+ 38+    Opposition 10 1++          Wrist 12/5/2022 12/5/2022 12/19/2022 1/9/2023 1/23/2023 2/13/2023    AROM (PROM) Left Right Right Right Right Right    Extension 60 50+++ 53++ 59 min 60- 60-    Flexion 75 65+ 70+ 75+ 75- 75-    RD 17 15+++ 20+++ 17++ 15+ 19+    UD 40 22+++ 20+++ 29++ 25++ 37+    UD with Th Flex full           Resisted Testing  Pain Report: - none  + mild    ++ moderate    +++ severe    12/5/22 1/9/23 2/13/23    APL +++ ++ ++    EPB +++ - -    EPL +++ - -    FCR +++ ++ + to ++      Strength   (Measured in pounds)  NT due pain    Special Tests   Pain Report:  - none    + mild    ++ moderate    +++ severe    12/5/2022 1/9/2023 1/16/2023   Finkelsteins +++ ++ pull   Radial Nerve Tinel's (DRSN) +++ minimal    Ap Joint Laxity of Trapezium NT      Crepitus Present NT     CMC Adduction Stress Test NT     CMC Extension Stress Test NT     WHAT test NT         Neural Tension Testing  RNT: Radial Neurodynamic Test (based on DS Armando's ULNT)   12/5/2022 1/9/2023 2/13/2023 3/1/2023   0-5 Scale 1 2 3 4   Position:   0/5: Arm across abdomen in coronal plane  1/5: Depress shoulder, ER to neutral ABD shoulder to 45 degrees  2/5: IR shoulder to end range, keep elbow at 90 degrees  3/5: Extend elbow to 0 degrees  4/5: Fully pronate forearm  5/5: Flex wrist and fingers with UD  Notes:    (+) indicates beyond grade level but less than long-term to next level    (-) indicates over long-term to level    S1  onset/change of patient's symptoms    S2 definite stop point based on patient's discomfort level    RNT: Median Neurodynamic Test (based on DS Armando's ULNT)   3/1/2023      0-5 Scale 2      Position:   0/5: Arm across abdomen in coronal plane  1/5: Depress shoulder, ER to neutral ABD shoulder to 45 degrees  2/5: IR shoulder to end range, keep elbow at 90 degrees  3/5: Extend elbow to 0 degrees  4/5: Fully pronate forearm  5/5: Flex wrist and fingers with UD  Notes:    (+) indicates beyond grade level but less than long-term to next level    (-) indicates over long-term to level    S1  onset/change of patient's symptoms    S2 definite stop point based on patient's discomfort level    Palpation  Pain Report:  - none    + mild    ++ moderate    +++ severe    12/5/22 12/26/22 1/2/23 1/9/23 1/16/23 1/23/23 2/13/23 3/1/2023   Radial Styloid +++ ++ + mimimal mimimal minimal + -   1st DC +++ ++ + minimal mimimal minimal + -   FCR +++ ++ + ++ - minimal + +   Thumb CMC NT +++ + minimal mimimal -  +   PIN Site ++ + + -       Extensor Wad ++ ++ + -       Web space  +++ ++ -           Treatment Plan:    Modalities:    US   Therapeutic Exercise:    AROM  Neuromuscular re-ed:   Nerve Gliding, Posture and Kinesiotaping  Manual Techniques:   Friction massage and Myofascial release  Orthotic  "Fabrication:    Static and Forearm based  Self Care:    Self Care Tasks and Ergonomic Considerations    Discharge Plan:  Achieve all LTG.  Independent in home treatment program.  Reach maximal therapeutic benefit.    Discharge Plan:    Achieve all LTG.  Independent in home treatment program.  Reach maximal therapeutic benefit.    Home Exercise Program:  Foam roller massage to scapular area; side upper arm and pec area  Exercise Name: Education Sheet General - Sessions: 1, Notes: perpendicular one direction massage on sore area. Massage from bottom of wrist to top of wrist.  Icing 5-7 x/day, EMR Notes: perpendicular one direction massage on sore area. Massage from bottom of wrist to top of wrist.  Icing 5-7 x/day  Exercise Name: deQuervain Overview - Sessions: 1, Notes: Golf ball massage wrist up to elbow area. 30 sec each trigger point.  Use your fingers to do \"strip\" motion in thumb sided wrist area.  Cold pack for 5-10 min. after massage  Exercise Name: Nerve Gliding Proximal Radial, Sets: 1 - Reps: 5-10 - Sessions: 2-4, Notes: only drop shoulder and slightly bend wrist. No pain, EMR Notes: only drop shoulder and slightly bend wrist. No pain  Doorway stretch  Vertical mouse  Potential split keyboard    12/19/2022  k-tape (strips and one around wrist)  Cont wear orthosis HS and 1/2 or more day    12/26/2022  Exercise Name: [place] Massage to Thumb - Sessions: 1x/day or every other if sore, Notes: Use a golf ball. Hold on tender spot for 30-60 seconds.  Move to the next spot. Search for tender areas in the entire thumb pad.  Then put golf ball on web space and use a pencil to push down on top of web space, EMR Notes: Use a golf ball. Hold on tender spot for 30-60 seconds.  Move to the next spot. Search for tender areas in the entire thumb pad.  Then put golf ball on web space and use a pencil to push down on top of web space  Exercise Name: Thumb Stabilization Web Space Release Method 1 with Clip - Sessions: 1, " Notes: 2-3 minutes  Exercise Name: Thumb Stabilization, Repositioning, Method 1(on chest) - Reps: 1 - Sessions: 1-2, Notes: Hold to the count of 5 or 10, EMR Notes: Hold to the count of 5 or 10    2/13/2023  Massage while also stretching (FCR; APL; EPB)    Next Visit:  US  MFR  Nerve gliding  Posture education  Cont discussing computer ergo discussion

## 2023-03-08 ENCOUNTER — THERAPY VISIT (OUTPATIENT)
Dept: OCCUPATIONAL THERAPY | Facility: CLINIC | Age: 73
End: 2023-03-08
Payer: COMMERCIAL

## 2023-03-08 DIAGNOSIS — M25.531 RIGHT WRIST PAIN: Primary | ICD-10-CM

## 2023-03-08 PROCEDURE — 97035 APP MDLTY 1+ULTRASOUND EA 15: CPT | Mod: GO | Performed by: OCCUPATIONAL THERAPIST

## 2023-03-08 PROCEDURE — 97110 THERAPEUTIC EXERCISES: CPT | Mod: GO | Performed by: OCCUPATIONAL THERAPIST

## 2023-03-08 PROCEDURE — 97140 MANUAL THERAPY 1/> REGIONS: CPT | Mod: GO | Performed by: OCCUPATIONAL THERAPIST

## 2023-03-08 NOTE — PROGRESS NOTES
SOAP note objective information for 3/8/2023.    Please refer to the daily flowsheet for treatment today, total treatment time and time spent performing 1:1 timed codes.         Objective:    Pain Level (Scale 0-10):   12/5/22 12/19/22 12/26/22 12/26/22 1/2/23 1/9/23 1/16/2023 1/23/2023 2/13/2023 3/1/2023 3/8/2023   At Rest 3 1-2 2-3 2-3 2 0-1 0-1 0-1 0 0 0   With Use 4-8 4-5 5 5 5-6 2-3 0-2 0-2 0-1 0-1 0-1     Pain Description:  Date 12/5/2022 12/19/2022    Location wrist     Pain Quality Aching, Sharp, Shooting and Tender, burning     Frequency constant       Pain is worst  daytime     Exacerbated by  use     Relieved by NSAIDs     Progression worsening improving      Sensation   WNL throughout all nerve distributions; per patient report    Edema   - none  + mild    ++ moderate    +++ severe    12/5/2022 12/19/2022 1/9/2023   1st DC +++Left 18.4 cm  Right 21.0 cm ++  Right 21.0 cm 20.6 cm   Radial Styloid +++ ++       ROM  Pain Report: - none  + mild    ++ moderate    +++ severe   Thumb 12/5/2022 12/5/2022 12/19/2022 1/9/2023 2/13/2023    AROM (PROM) Left Right Right Right Right Right   MP +/55 0/55-       IP 0/85 0/85-       RABD 55 42+++ 30++ 39+ 45-    PABD 45 42+ 42++ 38+ 38+    Opposition 10 1++          Wrist 12/5/2022 12/5/2022 12/19/2022 1/9/2023 1/23/2023 2/13/2023 3/8/2023   AROM (PROM) Left Right Right Right Right Right Right   Extension 60 50+++ 53++ 59 min 60- 60-    Flexion 75 65+ 70+ 75+ 75- 75-    RD 17 15+++ 20+++ 17++ 15+ 19+ 19+   UD 40 22+++ 20+++ 29++ 25++ 37+ 37-   UD with Th Flex full           Resisted Testing  Pain Report: - none  + mild    ++ moderate    +++ severe    12/5/22 1/9/23 2/13/23 3/8/23   APL +++ ++ ++ +   EPB +++ - -    EPL +++ - -    FCR +++ ++ + to ++ ++     Strength   (Measured in pounds)  NT due pain    Special Tests   Pain Report:  - none    + mild    ++ moderate    +++ severe    12/5/2022 1/9/2023 1/16/2023   Finkelsteins +++ ++ pull   Radial Nerve Tinel's (DRSN) +++  minimal    Ap Joint Laxity of Trapezium NT     Crepitus Present NT     CMC Adduction Stress Test NT     CMC Extension Stress Test NT     WHAT test NT         Neural Tension Testing  RNT: Radial Neurodynamic Test (based on DS Armando's ULNT)   12/5/2022 1/9/2023 2/13/2023 3/1/2023    0-5 Scale 1 2 3 4    Position:   0/5: Arm across abdomen in coronal plane  1/5: Depress shoulder, ER to neutral ABD shoulder to 45 degrees  2/5: IR shoulder to end range, keep elbow at 90 degrees  3/5: Extend elbow to 0 degrees  4/5: Fully pronate forearm  5/5: Flex wrist and fingers with UD  Notes:    (+) indicates beyond grade level but less than senior care to next level    (-) indicates over senior care to level    S1  onset/change of patient's symptoms    S2 definite stop point based on patient's discomfort level    RNT: Median Neurodynamic Test (based on DS Armando's ULNT)   3/1/2023      0-5 Scale 2      Position:   0/5: Arm across abdomen in coronal plane  1/5: Depress shoulder, ER to neutral ABD shoulder to 45 degrees  2/5: IR shoulder to end range, keep elbow at 90 degrees  3/5: Extend elbow to 0 degrees  4/5: Fully pronate forearm  5/5: Flex wrist and fingers with UD  Notes:    (+) indicates beyond grade level but less than senior care to next level    (-) indicates over senior care to level    S1  onset/change of patient's symptoms    S2 definite stop point based on patient's discomfort level    Palpation  Pain Report:  - none    + mild    ++ moderate    +++ severe    12/5/22 12/26/22 1/2/23 1/9/23 1/16/23 1/23/23 2/13/23 3/1/2023 3/8/2023   Radial Styloid +++ ++ + mimimal mimimal minimal + -    1st DC +++ ++ + minimal mimimal minimal + -    FCR +++ ++ + ++ - minimal + + +   Thumb CMC NT +++ + minimal mimimal -  + +   PIN Site ++ + + -        Extensor Wad ++ ++ + -        Web space  +++ ++ -            Treatment Plan:    Modalities:    US   Therapeutic Exercise:    AROM  Neuromuscular re-ed:   Nerve Gliding, Posture and Kinesiotaping  Manual  "Techniques:   Friction massage and Myofascial release  Orthotic Fabrication:    Static and Forearm based  Self Care:    Self Care Tasks and Ergonomic Considerations    Discharge Plan:  Achieve all LTG.  Independent in home treatment program.  Reach maximal therapeutic benefit.    Discharge Plan:    Achieve all LTG.  Independent in home treatment program.  Reach maximal therapeutic benefit.    Home Exercise Program:  Foam roller massage to scapular area; side upper arm and pec area  Exercise Name: Education Sheet General - Sessions: 1, Notes: perpendicular one direction massage on sore area. Massage from bottom of wrist to top of wrist.  Icing 5-7 x/day, EMR Notes: perpendicular one direction massage on sore area. Massage from bottom of wrist to top of wrist.  Icing 5-7 x/day  Exercise Name: deQuervain Overview - Sessions: 1, Notes: Golf ball massage wrist up to elbow area. 30 sec each trigger point.  Use your fingers to do \"strip\" motion in thumb sided wrist area.  Cold pack for 5-10 min. after massage  Exercise Name: Nerve Gliding Proximal Radial, Sets: 1 - Reps: 5-10 - Sessions: 2-4, Notes: only drop shoulder and slightly bend wrist. No pain, EMR Notes: only drop shoulder and slightly bend wrist. No pain  Doorway stretch  Vertical mouse  Potential split keyboard    12/19/2022  k-tape (strips and one around wrist)  Cont wear orthosis HS and 1/2 or more day    12/26/2022  Exercise Name: [place] Massage to Thumb - Sessions: 1x/day or every other if sore, Notes: Use a golf ball. Hold on tender spot for 30-60 seconds.  Move to the next spot. Search for tender areas in the entire thumb pad.  Then put golf ball on web space and use a pencil to push down on top of web space, EMR Notes: Use a golf ball. Hold on tender spot for 30-60 seconds.  Move to the next spot. Search for tender areas in the entire thumb pad.  Then put golf ball on web space and use a pencil to push down on top of web space  Exercise Name: Thumb " Stabilization Web Space Release Method 1 with Clip - Sessions: 1, Notes: 2-3 minutes  Exercise Name: Thumb Stabilization, Repositioning, Method 1(on chest) - Reps: 1 - Sessions: 1-2, Notes: Hold to the count of 5 or 10, EMR Notes: Hold to the count of 5 or 10    2/13/2023  Massage while also stretching (FCR; APL; EPB)    3/8/2023  Golf ball massage flexor wad and entire volar fa    Next Visit:  US  MFR  Nerve gliding  Posture education  Cont discussing computer ergo discussion

## 2023-03-17 ENCOUNTER — HOSPITAL ENCOUNTER (OUTPATIENT)
Dept: OCCUPATIONAL THERAPY | Facility: CLINIC | Age: 73
Discharge: HOME | End: 2023-03-17
Payer: COMMERCIAL

## 2023-03-17 DIAGNOSIS — C50.212 MALIGNANT NEOPLASM OF UPPER-INNER QUADRANT OF LEFT BREAST IN FEMALE, ESTROGEN RECEPTOR POSITIVE (H): ICD-10-CM

## 2023-03-17 DIAGNOSIS — C50.811 MALIGNANT NEOPLASM OF OVERLAPPING SITES OF RIGHT BREAST IN FEMALE, ESTROGEN RECEPTOR POSITIVE (H): ICD-10-CM

## 2023-03-17 DIAGNOSIS — I97.2 LYMPHEDEMA SYNDROME, POSTMASTECTOMY: Primary | ICD-10-CM

## 2023-03-17 DIAGNOSIS — Z17.0 MALIGNANT NEOPLASM OF UPPER-INNER QUADRANT OF LEFT BREAST IN FEMALE, ESTROGEN RECEPTOR POSITIVE (H): ICD-10-CM

## 2023-03-17 DIAGNOSIS — Z17.0 MALIGNANT NEOPLASM OF OVERLAPPING SITES OF RIGHT BREAST IN FEMALE, ESTROGEN RECEPTOR POSITIVE (H): ICD-10-CM

## 2023-03-17 PROCEDURE — 97140 MANUAL THERAPY 1/> REGIONS: CPT | Mod: GO

## 2023-03-20 NOTE — PROGRESS NOTES
Hand Therapy Progress Note    Current Date:  3/22/2023    Diagnosis: Right wrist pain  DOI: August 2022    Precautions: cancer; lymphodema    Reporting period is 2/13/2023 to 3/22/2023    Subjective:   Subjective changes noted by patient:  Overall really good and not much pain at all.  Some pulling on the top of my thumb, but not too bad.    Functional changes noted by patient:  Improvement in Self Care Tasks (dressing, eating, bathing, hygiene/toileting), Work Tasks, Sleep Patterns, Recreational Activities, Household Chores and Driving   Patient has noted adverse reaction to:  None    Functional Outcome Measure:  Upper Extremity Functional Index Score:  SCORE:   Column Totals: /80: 76   (A lower score indicates greater disability.)    Objective:    Pain Level (Scale 0-10):   12/5/22 12/19/22 12/26/22 12/26/22 1/2/23 1/9/23 1/16/2023 1/23/2023 2/13/2023 3/1/2023 3/8/2023   At Rest 3 1-2 2-3 2-3 2 0-1 0-1 0-1 0 0 0   With Use 4-8 4-5 5 5 5-6 2-3 0-2 0-2 0-1 0-1 0-1      3/22/23             At Rest 0             With Use 0-1               Pain Description:  Date 12/5/2022 12/19/2022    Location wrist     Pain Quality Aching, Sharp, Shooting and Tender, burning     Frequency constant       Pain is worst  daytime     Exacerbated by  use     Relieved by NSAIDs     Progression worsening improving      Sensation   WNL throughout all nerve distributions; per patient report    Edema   - none  + mild    ++ moderate    +++ severe    12/5/2022 12/19/2022 1/9/2023 3/22/2023   1st DC +++Left 18.4 cm  Right 21.0 cm ++  Right 21.0 cm 20.6 cm 20.5cm   Radial Styloid +++ ++        ROM  Pain Report: - none  + mild    ++ moderate    +++ severe   Thumb 12/5/2022 12/5/2022 12/19/2022 1/9/2023 2/13/2023 3/22/2023   AROM (PROM) Left Right Right Right Right Right   MP +/55 0/55-       IP 0/85 0/85-       RABD 55 42+++ 30++ 39+ 45- 45-   PABD 45 42+ 42++ 38+ 38+ 38-   Opposition 10 1++          Wrist 12/5/2022 12/5/2022 12/19/2022 1/9/2023  1/23/2023 2/13/2023 3/8/2023 3/22/2023   AROM (PROM) Left Right Right Right Right Right Right Right   Extension 60 50+++ 53++ 59 min 60- 60-     Flexion 75 65+ 70+ 75+ 75- 75-     RD 17 15+++ 20+++ 17++ 15+ 19+ 19+ 21-   UD 40 22+++ 20+++ 29++ 25++ 37+ 37- 37-   UD with Th Flex full            Resisted Testing  Pain Report: - none  + mild    ++ moderate    +++ severe    12/5/22 1/9/23 2/13/23 3/8/23 3/22/23   APL +++ ++ ++ + -   EPB +++ - -  -   EPL +++ - -  -   FCR +++ ++ + to ++ ++ -     Strength   (Measured in pounds)  NT due pain    Special Tests   Pain Report:  - none    + mild    ++ moderate    +++ severe    12/5/2022 1/9/2023 1/16/2023 3/22/2023   Finkandaces +++ ++ pull -   Radial Nerve Tinel's (DRSN) +++ minimal     Ap Joint Laxity of Trapezium NT      Crepitus Present NT      CMC Adduction Stress Test NT      CMC Extension Stress Test NT      WHAT test NT          Neural Tension Testing  RNT: Radial Neurodynamic Test (based on DS Prabha's ULNT)   12/5/2022 1/9/2023 2/13/2023 3/1/2023 3/22/2023   0-5 Scale 1 2 3 4 5   Position:   0/5: Arm across abdomen in coronal plane  1/5: Depress shoulder, ER to neutral ABD shoulder to 45 degrees  2/5: IR shoulder to end range, keep elbow at 90 degrees  3/5: Extend elbow to 0 degrees  4/5: Fully pronate forearm  5/5: Flex wrist and fingers with UD  Notes:    (+) indicates beyond grade level but less than FPC to next level    (-) indicates over FPC to level    S1  onset/change of patient's symptoms    S2 definite stop point based on patient's discomfort level    RNT: Median Neurodynamic Test (based on DS Armando's ULNT)   3/1/2023 3/22/2023     0-5 Scale 2 5     Position:   0/5: Arm across abdomen in coronal plane  1/5: Depress shoulder, ER to neutral ABD shoulder to 45 degrees  2/5: IR shoulder to end range, keep elbow at 90 degrees  3/5: Extend elbow to 0 degrees  4/5: Fully pronate forearm  5/5: Flex wrist and fingers with UD  Notes:    (+) indicates beyond  grade level but less than prison to next level    (-) indicates over prison to level    S1  onset/change of patient's symptoms    S2 definite stop point based on patient's discomfort level    Palpation  Pain Report:  - none    + mild    ++ moderate    +++ severe    12/5/22 12/26/22 1/2/23 1/9/23 1/16/23 1/23/23 2/13/23 3/1/2023 3/8/2023 3/22/23   Radial Styloid +++ ++ + mimimal mimimal minimal + -     1st DC +++ ++ + minimal mimimal minimal + -     FCR +++ ++ + ++ - minimal + + + -   Thumb CMC NT +++ + minimal mimimal -  + + -   PIN Site ++ + + -         Extensor Wad ++ ++ + -         Web space  +++ ++ -             Assessment:  Response to therapy has been improvement to:  ROM of Wrist:  All Planes  Thumb:  All Planes  Flexibility:  tendon gliding is improved, improved excursion of involved muscles and less tightness in involved muscles  Edema:  less  Pain:  frequency is less, intensity of pain is decreased, duration of pain is decreased and less tender over affected area    Overall Assessment:  Patient's symptoms are resolving.  STG/LTG:  STGoals have been reviewed and progress or achievement has occurred;  see goal sheet for details and updates.    Plan:  Frequency/Duration:  Recommend continuing to see patient  1x in 1 month  Appropriateness of Rx I have re-evaluated this patient and find that the nature, scope, duration and intensity of the therapy is appropriate for the medical condition of the patient.  Recommendations for Continued Therapy  Additions to Treatment Plan -  Neuromuscular re-education:  Nerve Gliding (ulnar)    Treatment Plan:    Modalities:    US   Therapeutic Exercise:    AROM  Neuromuscular re-ed:   Nerve Gliding, Posture and Kinesiotaping  Manual Techniques:   Friction massage and Myofascial release  Orthotic Fabrication:    Static and Forearm based  Self Care:    Self Care Tasks and Ergonomic Considerations    Discharge Plan:  Achieve all LTG.  Independent in home treatment  "program.  Reach maximal therapeutic benefit.    Discharge Plan:    Achieve all LTG.  Independent in home treatment program.  Reach maximal therapeutic benefit.    Home Exercise Program:  Foam roller massage to scapular area; side upper arm and pec area  Exercise Name: Education Sheet General - Sessions: 1, Notes: perpendicular one direction massage on sore area. Massage from bottom of wrist to top of wrist.  Icing 5-7 x/day, EMR Notes: perpendicular one direction massage on sore area. Massage from bottom of wrist to top of wrist.  Icing 5-7 x/day  Exercise Name: deQuervain Overview - Sessions: 1, Notes: Golf ball massage wrist up to elbow area. 30 sec each trigger point.  Use your fingers to do \"strip\" motion in thumb sided wrist area.  Cold pack for 5-10 min. after massage  Exercise Name: Nerve Gliding Proximal Radial, Sets: 1 - Reps: 5-10 - Sessions: 2-4, Notes: only drop shoulder and slightly bend wrist. No pain, EMR Notes: only drop shoulder and slightly bend wrist. No pain  Doorway stretch  Vertical mouse  Potential split keyboard    12/19/2022  k-tape (strips and one around wrist)  Cont wear orthosis HS and 1/2 or more day    12/26/2022  Exercise Name: [place] Massage to Thumb - Sessions: 1x/day or every other if sore, Notes: Use a golf ball. Hold on tender spot for 30-60 seconds.  Move to the next spot. Search for tender areas in the entire thumb pad.  Then put golf ball on web space and use a pencil to push down on top of web space, EMR Notes: Use a golf ball. Hold on tender spot for 30-60 seconds.  Move to the next spot. Search for tender areas in the entire thumb pad.  Then put golf ball on web space and use a pencil to push down on top of web space  Exercise Name: Thumb Stabilization Web Space Release Method 1 with Clip - Sessions: 1, Notes: 2-3 minutes  Exercise Name: Thumb Stabilization, Repositioning, Method 1(on chest) - Reps: 1 - Sessions: 1-2, Notes: Hold to the count of 5 or 10, EMR Notes: Hold to " the count of 5 or 10    2/13/2023  Massage while also stretching (FCR; APL; EPB)    3/8/2023  Golf ball massage flexor wad and entire volar fa    3/22/2023  Exercise Name: Nerve Gliding Proximal Ulnar - Reps: 5-10 - Sessions: 2, Notes: No pain and no tingling., EMR Notes: No pain and no tingling.    Next Visit:  US  MFR  Nerve gliding  Posture education  Cont discussing computer ergo discussion

## 2023-03-22 ENCOUNTER — THERAPY VISIT (OUTPATIENT)
Dept: OCCUPATIONAL THERAPY | Facility: CLINIC | Age: 73
End: 2023-03-22
Payer: COMMERCIAL

## 2023-03-22 DIAGNOSIS — M25.531 RIGHT WRIST PAIN: Primary | ICD-10-CM

## 2023-03-22 PROCEDURE — 97140 MANUAL THERAPY 1/> REGIONS: CPT | Mod: GO | Performed by: OCCUPATIONAL THERAPIST

## 2023-03-22 PROCEDURE — 97035 APP MDLTY 1+ULTRASOUND EA 15: CPT | Mod: GO | Performed by: OCCUPATIONAL THERAPIST

## 2023-03-22 PROCEDURE — 97110 THERAPEUTIC EXERCISES: CPT | Mod: GO | Performed by: OCCUPATIONAL THERAPIST

## 2023-04-03 DIAGNOSIS — Z17.0 MALIGNANT NEOPLASM OF UPPER-INNER QUADRANT OF LEFT BREAST IN FEMALE, ESTROGEN RECEPTOR POSITIVE (H): ICD-10-CM

## 2023-04-03 DIAGNOSIS — C50.212 MALIGNANT NEOPLASM OF UPPER-INNER QUADRANT OF LEFT BREAST IN FEMALE, ESTROGEN RECEPTOR POSITIVE (H): ICD-10-CM

## 2023-04-03 DIAGNOSIS — C50.811 MALIGNANT NEOPLASM OF OVERLAPPING SITES OF RIGHT BREAST IN FEMALE, ESTROGEN RECEPTOR POSITIVE (H): ICD-10-CM

## 2023-04-03 DIAGNOSIS — Z17.0 MALIGNANT NEOPLASM OF OVERLAPPING SITES OF RIGHT BREAST IN FEMALE, ESTROGEN RECEPTOR POSITIVE (H): ICD-10-CM

## 2023-04-03 RX ORDER — LETROZOLE 2.5 MG/1
2.5 TABLET, FILM COATED ORAL DAILY
Qty: 90 TABLET | Refills: 0 | Status: SHIPPED | OUTPATIENT
Start: 2023-04-03 | End: 2023-10-11

## 2023-04-17 ENCOUNTER — HOSPITAL ENCOUNTER (OUTPATIENT)
Dept: OCCUPATIONAL THERAPY | Facility: CLINIC | Age: 73
Discharge: HOME | End: 2023-04-17
Payer: COMMERCIAL

## 2023-04-17 DIAGNOSIS — C50.811 MALIGNANT NEOPLASM OF OVERLAPPING SITES OF RIGHT BREAST IN FEMALE, ESTROGEN RECEPTOR POSITIVE (H): ICD-10-CM

## 2023-04-17 DIAGNOSIS — Z17.0 MALIGNANT NEOPLASM OF OVERLAPPING SITES OF RIGHT BREAST IN FEMALE, ESTROGEN RECEPTOR POSITIVE (H): ICD-10-CM

## 2023-04-17 DIAGNOSIS — Z17.0 MALIGNANT NEOPLASM OF UPPER-INNER QUADRANT OF LEFT BREAST IN FEMALE, ESTROGEN RECEPTOR POSITIVE (H): ICD-10-CM

## 2023-04-17 DIAGNOSIS — I97.2 LYMPHEDEMA SYNDROME, POSTMASTECTOMY: Primary | ICD-10-CM

## 2023-04-17 DIAGNOSIS — C50.212 MALIGNANT NEOPLASM OF UPPER-INNER QUADRANT OF LEFT BREAST IN FEMALE, ESTROGEN RECEPTOR POSITIVE (H): ICD-10-CM

## 2023-04-17 PROCEDURE — 97140 MANUAL THERAPY 1/> REGIONS: CPT | Mod: GO

## 2023-04-17 NOTE — PROGRESS NOTES
Hand Therapy Progress Note    Current Date:  4/19/2023    Diagnosis: Right wrist pain  DOI: August 2022    Precautions: cancer; lymphodema    Reporting period is  3/22/2023 to 4/19/2023      Subjective:   Subjective changes noted by patient: Overall not a lot of pain.  Easier to hold objects. Still difficult to hold groceries.    Functional changes noted by patient:  Improvement in Self Care Tasks (dressing, eating, bathing, hygiene/toileting), Work Tasks, Sleep Patterns, Recreational Activities, Household Chores and Driving   Patient has noted adverse reaction to:  None    Functional Outcome Measure:  Upper Extremity Functional Index Score:  SCORE:   Column Totals: /80: 78   (A lower score indicates greater disability.)      Objective:    Pain Level (Scale 0-10):   12/5/22 12/19/22 12/26/22 12/26/22 1/2/23 1/9/23 1/16/2023 1/23/2023 2/13/2023 3/1/2023 3/8/2023   At Rest 3 1-2 2-3 2-3 2 0-1 0-1 0-1 0 0 0   With Use 4-8 4-5 5 5 5-6 2-3 0-2 0-2 0-1 0-1 0-1      3/22/23 4/19/2023            At Rest 0 0            With Use 0-1 0-1              Pain Description:  Date 12/5/2022 12/19/2022    Location wrist     Pain Quality Aching, Sharp, Shooting and Tender, burning     Frequency constant       Pain is worst  daytime     Exacerbated by  use     Relieved by NSAIDs     Progression worsening improving      Sensation   WNL throughout all nerve distributions; per patient report    Edema   - none  + mild    ++ moderate    +++ severe    12/5/2022 12/19/2022 1/9/2023 3/22/2023   1st DC +++Left 18.4 cm  Right 21.0 cm ++  Right 21.0 cm 20.6 cm 20.5cm   Radial Styloid +++ ++        ROM  Pain Report: - none  + mild    ++ moderate    +++ severe   Thumb 12/5/2022 12/5/2022 12/19/2022 1/9/2023 2/13/2023 3/22/2023   AROM (PROM) Left Right Right Right Right Right   MP +/55 0/55-       IP 0/85 0/85-       RABD 55 42+++ 30++ 39+ 45- 45-   PABD 45 42+ 42++ 38+ 38+ 38-   Opposition 10 1++          Wrist 12/5/2022 12/5/2022 12/19/2022  1/9/2023 1/23/2023 2/13/2023 3/8/2023 3/22/2023   AROM (PROM) Left Right Right Right Right Right Right Right   Extension 60 50+++ 53++ 59 min 60- 60-     Flexion 75 65+ 70+ 75+ 75- 75-     RD 17 15+++ 20+++ 17++ 15+ 19+ 19+ 21-   UD 40 22+++ 20+++ 29++ 25++ 37+ 37- 37-   UD with Th Flex full            Wrist 4/19/2023          AROM (PROM) Left Right Right Right Right Right Right Right   Extension 62-          Flexion 80-          RD 20-          UD 37-          UD with Th Flex               Resisted Testing  Pain Report: - none  + mild    ++ moderate    +++ severe    12/5/22 1/9/23 2/13/23 3/8/23 3/22/23 4/19/23   APL +++ ++ ++ + - -   EPB +++ - -  - -   EPL +++ - -  - -   FCR +++ ++ + to ++ ++ - -     Strength   (Measured in pounds)  NT due pain    Special Tests   Pain Report:  - none    + mild    ++ moderate    +++ severe    12/5/2022 1/9/2023 1/16/2023 3/22/2023    Finkelsteins +++ ++ pull -    Radial Nerve Tinel's (DRSN) +++ minimal      Ap Joint Laxity of Trapezium NT       Crepitus Present NT       CMC Adduction Stress Test NT       CMC Extension Stress Test NT       WHAT test NT           Neural Tension Testing  RNT: Radial Neurodynamic Test (based on DONALD Armando's ULNT)   12/5/2022 1/9/2023 2/13/2023 3/1/2023 3/22/2023   0-5 Scale 1 2 3 4 5   Position:   0/5: Arm across abdomen in coronal plane  1/5: Depress shoulder, ER to neutral ABD shoulder to 45 degrees  2/5: IR shoulder to end range, keep elbow at 90 degrees  3/5: Extend elbow to 0 degrees  4/5: Fully pronate forearm  5/5: Flex wrist and fingers with UD  Notes:    (+) indicates beyond grade level but less than [facility] to next level    (-) indicates over [facility] to level    S1  onset/change of patient's symptoms    S2 definite stop point based on patient's discomfort level    RNT: Median Neurodynamic Test (based on DONALD Armando's ULNT)   3/1/2023 3/22/2023 4/19/2023    0-5 Scale 2 5 5    Position:   0/5: Arm across abdomen in coronal plane  1/5: Depress  shoulder, ER to neutral ABD shoulder to 45 degrees  2/5: IR shoulder to end range, keep elbow at 90 degrees  3/5: Extend elbow to 0 degrees  4/5: Fully pronate forearm  5/5: Flex wrist and fingers with UD  Notes:    (+) indicates beyond grade level but less than long-term to next level    (-) indicates over long-term to level    S1  onset/change of patient's symptoms    S2 definite stop point based on patient's discomfort level    Palpation  Pain Report:  - none    + mild    ++ moderate    +++ severe    12/5/22 12/26/22 1/2/23 1/9/23 1/16/23 1/23/23 2/13/23 3/1/2023 3/8/2023 3/22/23 4/19/23   Radial Styloid +++ ++ + mimimal mimimal minimal + -   -   1st DC +++ ++ + minimal mimimal minimal + -   -   FCR +++ ++ + ++ - minimal + + + - -   Thumb CMC NT +++ + minimal mimimal -  + + - -   PIN Site ++ + + -       -   Extensor Wad ++ ++ + -       -   Web space  +++ ++ -       -       Assessment:  Response to therapy has been improvement to:  ROM of Wrist:  All Planes  Thumb:  All Planes  Flexibility:  tendon gliding is improved, improved excursion of involved muscles and less tightness in involved muscles  Edema:  less  Pain:  frequency is less, intensity of pain is decreased, duration of pain is decreased and less tender over affected area    Overall Assessment:  Patient's symptoms are resolving.  STG/LTG:  STGoals have been reviewed and progress or achievement has occurred;  see goal sheet for details and updates.    Plan:  Frequency/Duration:  Recommend continuing to see patient  1x in 1 month  Appropriateness of Rx I have re-evaluated this patient and find that the nature, scope, duration and intensity of the therapy is appropriate for the medical condition of the patient.  Recommendations for Continued Therapy    Treatment Plan:    Modalities:    US   Therapeutic Exercise:    AROM  Neuromuscular re-ed:   Nerve Gliding, Posture and Kinesiotaping  Manual Techniques:   Friction massage and Myofascial release  Orthotic  "Fabrication:    Static and Forearm based  Self Care:    Self Care Tasks and Ergonomic Considerations    Discharge Plan:  Achieve all LTG.  Independent in home treatment program.  Reach maximal therapeutic benefit.    Discharge Plan:    Achieve all LTG.  Independent in home treatment program.  Reach maximal therapeutic benefit.    Home Exercise Program:  Foam roller massage to scapular area; side upper arm and pec area  Exercise Name: Education Sheet General - Sessions: 1, Notes: perpendicular one direction massage on sore area. Massage from bottom of wrist to top of wrist.  Icing 5-7 x/day, EMR Notes: perpendicular one direction massage on sore area. Massage from bottom of wrist to top of wrist.  Icing 5-7 x/day  Exercise Name: deQuervain Overview - Sessions: 1, Notes: Golf ball massage wrist up to elbow area. 30 sec each trigger point.  Use your fingers to do \"strip\" motion in thumb sided wrist area.  Cold pack for 5-10 min. after massage  Exercise Name: Nerve Gliding Proximal Radial, Sets: 1 - Reps: 5-10 - Sessions: 2-4, Notes: only drop shoulder and slightly bend wrist. No pain, EMR Notes: only drop shoulder and slightly bend wrist. No pain  Doorway stretch  Vertical mouse  Potential split keyboard    12/19/2022  k-tape (strips and one around wrist)  Cont wear orthosis HS and 1/2 or more day    12/26/2022  Exercise Name: Hutchinson Massage to Thumb - Sessions: 1x/day or every other if sore, Notes: Use a golf ball. Hold on tender spot for 30-60 seconds.  Move to the next spot. Search for tender areas in the entire thumb pad.  Then put golf ball on web space and use a pencil to push down on top of web space, EMR Notes: Use a golf ball. Hold on tender spot for 30-60 seconds.  Move to the next spot. Search for tender areas in the entire thumb pad.  Then put golf ball on web space and use a pencil to push down on top of web space  Exercise Name: Thumb Stabilization Web Space Release Method 1 with Clip - Sessions: 1, " Notes: 2-3 minutes  Exercise Name: Thumb Stabilization, Repositioning, Method 1(on chest) - Reps: 1 - Sessions: 1-2, Notes: Hold to the count of 5 or 10, EMR Notes: Hold to the count of 5 or 10    2/13/2023  Massage while also stretching (FCR; APL; EPB)    3/8/2023  Golf ball massage flexor wad and entire volar fa    3/22/2023  Exercise Name: Nerve Gliding Proximal Ulnar - Reps: 5-10 - Sessions: 2, Notes: No pain and no tingling., EMR Notes: No pain and no tingling.    Next Visit:  US  MFR  Nerve gliding  Posture education  Cont discussing computer ergo discussion

## 2023-04-19 ENCOUNTER — THERAPY VISIT (OUTPATIENT)
Dept: OCCUPATIONAL THERAPY | Facility: CLINIC | Age: 73
End: 2023-04-19
Payer: COMMERCIAL

## 2023-04-19 DIAGNOSIS — M25.531 RIGHT WRIST PAIN: Primary | ICD-10-CM

## 2023-04-19 PROCEDURE — 97140 MANUAL THERAPY 1/> REGIONS: CPT | Mod: GO | Performed by: OCCUPATIONAL THERAPIST

## 2023-04-19 PROCEDURE — 97110 THERAPEUTIC EXERCISES: CPT | Mod: GO | Performed by: OCCUPATIONAL THERAPIST

## 2023-04-19 PROCEDURE — 97035 APP MDLTY 1+ULTRASOUND EA 15: CPT | Mod: GO | Performed by: OCCUPATIONAL THERAPIST

## 2023-05-25 ENCOUNTER — THERAPY VISIT (OUTPATIENT)
Dept: OCCUPATIONAL THERAPY | Facility: CLINIC | Age: 73
End: 2023-05-25
Payer: COMMERCIAL

## 2023-05-25 DIAGNOSIS — Z17.0 MALIGNANT NEOPLASM OF OVERLAPPING SITES OF RIGHT BREAST IN FEMALE, ESTROGEN RECEPTOR POSITIVE (H): ICD-10-CM

## 2023-05-25 DIAGNOSIS — I97.2 LYMPHEDEMA SYNDROME, POSTMASTECTOMY: Primary | ICD-10-CM

## 2023-05-25 DIAGNOSIS — Z17.0 MALIGNANT NEOPLASM OF UPPER-INNER QUADRANT OF LEFT BREAST IN FEMALE, ESTROGEN RECEPTOR POSITIVE (H): ICD-10-CM

## 2023-05-25 DIAGNOSIS — C50.212 MALIGNANT NEOPLASM OF UPPER-INNER QUADRANT OF LEFT BREAST IN FEMALE, ESTROGEN RECEPTOR POSITIVE (H): ICD-10-CM

## 2023-05-25 DIAGNOSIS — C50.811 MALIGNANT NEOPLASM OF OVERLAPPING SITES OF RIGHT BREAST IN FEMALE, ESTROGEN RECEPTOR POSITIVE (H): ICD-10-CM

## 2023-05-25 PROCEDURE — 97140 MANUAL THERAPY 1/> REGIONS: CPT | Mod: GO

## 2023-06-07 ENCOUNTER — THERAPY VISIT (OUTPATIENT)
Dept: OCCUPATIONAL THERAPY | Facility: CLINIC | Age: 73
End: 2023-06-07
Payer: COMMERCIAL

## 2023-06-07 DIAGNOSIS — I97.2 LYMPHEDEMA SYNDROME, POSTMASTECTOMY: Primary | ICD-10-CM

## 2023-06-07 DIAGNOSIS — C50.811 MALIGNANT NEOPLASM OF OVERLAPPING SITES OF RIGHT BREAST IN FEMALE, ESTROGEN RECEPTOR POSITIVE (H): ICD-10-CM

## 2023-06-07 DIAGNOSIS — Z17.0 MALIGNANT NEOPLASM OF UPPER-INNER QUADRANT OF LEFT BREAST IN FEMALE, ESTROGEN RECEPTOR POSITIVE (H): ICD-10-CM

## 2023-06-07 DIAGNOSIS — C50.212 MALIGNANT NEOPLASM OF UPPER-INNER QUADRANT OF LEFT BREAST IN FEMALE, ESTROGEN RECEPTOR POSITIVE (H): ICD-10-CM

## 2023-06-07 DIAGNOSIS — Z17.0 MALIGNANT NEOPLASM OF OVERLAPPING SITES OF RIGHT BREAST IN FEMALE, ESTROGEN RECEPTOR POSITIVE (H): ICD-10-CM

## 2023-06-07 PROCEDURE — 97140 MANUAL THERAPY 1/> REGIONS: CPT | Mod: GO

## 2023-06-14 PROBLEM — M25.531 RIGHT WRIST PAIN: Status: RESOLVED | Noted: 2022-12-05 | Resolved: 2023-06-14

## 2023-07-12 ENCOUNTER — THERAPY VISIT (OUTPATIENT)
Dept: OCCUPATIONAL THERAPY | Facility: CLINIC | Age: 73
End: 2023-07-12
Payer: COMMERCIAL

## 2023-07-12 DIAGNOSIS — I97.2 LYMPHEDEMA SYNDROME, POSTMASTECTOMY: Primary | ICD-10-CM

## 2023-07-12 DIAGNOSIS — C50.212 MALIGNANT NEOPLASM OF UPPER-INNER QUADRANT OF LEFT BREAST IN FEMALE, ESTROGEN RECEPTOR POSITIVE (H): ICD-10-CM

## 2023-07-12 DIAGNOSIS — Z17.0 MALIGNANT NEOPLASM OF OVERLAPPING SITES OF RIGHT BREAST IN FEMALE, ESTROGEN RECEPTOR POSITIVE (H): ICD-10-CM

## 2023-07-12 DIAGNOSIS — Z17.0 MALIGNANT NEOPLASM OF UPPER-INNER QUADRANT OF LEFT BREAST IN FEMALE, ESTROGEN RECEPTOR POSITIVE (H): ICD-10-CM

## 2023-07-12 DIAGNOSIS — C50.811 MALIGNANT NEOPLASM OF OVERLAPPING SITES OF RIGHT BREAST IN FEMALE, ESTROGEN RECEPTOR POSITIVE (H): ICD-10-CM

## 2023-07-12 PROCEDURE — 97140 MANUAL THERAPY 1/> REGIONS: CPT | Mod: GO

## 2023-08-09 ENCOUNTER — THERAPY VISIT (OUTPATIENT)
Dept: OCCUPATIONAL THERAPY | Facility: CLINIC | Age: 73
End: 2023-08-09
Payer: COMMERCIAL

## 2023-08-09 DIAGNOSIS — Z17.0 MALIGNANT NEOPLASM OF OVERLAPPING SITES OF RIGHT BREAST IN FEMALE, ESTROGEN RECEPTOR POSITIVE (H): ICD-10-CM

## 2023-08-09 DIAGNOSIS — I97.2 LYMPHEDEMA SYNDROME, POSTMASTECTOMY: Primary | ICD-10-CM

## 2023-08-09 DIAGNOSIS — C50.811 MALIGNANT NEOPLASM OF OVERLAPPING SITES OF RIGHT BREAST IN FEMALE, ESTROGEN RECEPTOR POSITIVE (H): ICD-10-CM

## 2023-08-09 DIAGNOSIS — Z17.0 MALIGNANT NEOPLASM OF UPPER-INNER QUADRANT OF LEFT BREAST IN FEMALE, ESTROGEN RECEPTOR POSITIVE (H): ICD-10-CM

## 2023-08-09 DIAGNOSIS — C50.212 MALIGNANT NEOPLASM OF UPPER-INNER QUADRANT OF LEFT BREAST IN FEMALE, ESTROGEN RECEPTOR POSITIVE (H): ICD-10-CM

## 2023-08-09 PROCEDURE — 97140 MANUAL THERAPY 1/> REGIONS: CPT | Mod: GO

## 2023-08-26 ENCOUNTER — HOSPITAL ENCOUNTER (EMERGENCY)
Facility: CLINIC | Age: 73
Discharge: HOME | End: 2023-08-26
Attending: EMERGENCY MEDICINE | Admitting: EMERGENCY MEDICINE
Payer: COMMERCIAL

## 2023-08-26 ENCOUNTER — APPOINTMENT (OUTPATIENT)
Dept: CT IMAGING | Facility: CLINIC | Age: 73
End: 2023-08-26
Payer: COMMERCIAL

## 2023-08-26 VITALS
DIASTOLIC BLOOD PRESSURE: 65 MMHG | HEART RATE: 80 BPM | HEIGHT: 66 IN | WEIGHT: 265 LBS | TEMPERATURE: 97.2 F | SYSTOLIC BLOOD PRESSURE: 129 MMHG | OXYGEN SATURATION: 95 % | BODY MASS INDEX: 42.59 KG/M2 | RESPIRATION RATE: 18 BRPM

## 2023-08-26 DIAGNOSIS — R06.02 SHORTNESS OF BREATH: ICD-10-CM

## 2023-08-26 DIAGNOSIS — U07.1 INFECTION DUE TO 2019 NOVEL CORONAVIRUS: ICD-10-CM

## 2023-08-26 DIAGNOSIS — R05.9 COUGH: ICD-10-CM

## 2023-08-26 LAB
ANION GAP SERPL CALCULATED.3IONS-SCNC: 14 MMOL/L (ref 7–15)
ATRIAL RATE - MUSE: 75 BPM
BASOPHILS # BLD AUTO: 0 10E3/UL (ref 0–0.2)
BASOPHILS NFR BLD AUTO: 0 %
BUN SERPL-MCNC: 13.7 MG/DL (ref 8–23)
CALCIUM SERPL-MCNC: 9.5 MG/DL (ref 8.8–10.2)
CHLORIDE SERPL-SCNC: 100 MMOL/L (ref 98–107)
CREAT SERPL-MCNC: 0.63 MG/DL (ref 0.51–0.95)
D DIMER PPP FEU-MCNC: 1.12 UG/ML FEU (ref 0–0.5)
DEPRECATED HCO3 PLAS-SCNC: 23 MMOL/L (ref 22–29)
DIASTOLIC BLOOD PRESSURE - MUSE: NORMAL MMHG
EOSINOPHIL # BLD AUTO: 0 10E3/UL (ref 0–0.7)
EOSINOPHIL NFR BLD AUTO: 0 %
ERYTHROCYTE [DISTWIDTH] IN BLOOD BY AUTOMATED COUNT: 14.1 % (ref 10–15)
GFR SERPL CREATININE-BSD FRML MDRD: >90 ML/MIN/1.73M2
GLUCOSE SERPL-MCNC: 140 MG/DL (ref 70–99)
HCT VFR BLD AUTO: 37.6 % (ref 35–47)
HGB BLD-MCNC: 12.4 G/DL (ref 11.7–15.7)
HOLD SPECIMEN: NORMAL
IMM GRANULOCYTES # BLD: 0 10E3/UL
IMM GRANULOCYTES NFR BLD: 0 %
INTERPRETATION ECG - MUSE: NORMAL
LYMPHOCYTES # BLD AUTO: 1 10E3/UL (ref 0.8–5.3)
LYMPHOCYTES NFR BLD AUTO: 13 %
MCH RBC QN AUTO: 31.7 PG (ref 26.5–33)
MCHC RBC AUTO-ENTMCNC: 33 G/DL (ref 31.5–36.5)
MCV RBC AUTO: 96 FL (ref 78–100)
MONOCYTES # BLD AUTO: 0.4 10E3/UL (ref 0–1.3)
MONOCYTES NFR BLD AUTO: 5 %
NEUTROPHILS # BLD AUTO: 6.5 10E3/UL (ref 1.6–8.3)
NEUTROPHILS NFR BLD AUTO: 82 %
NRBC # BLD AUTO: 0 10E3/UL
NRBC BLD AUTO-RTO: 0 /100
P AXIS - MUSE: 65 DEGREES
PLATELET # BLD AUTO: 156 10E3/UL (ref 150–450)
POTASSIUM SERPL-SCNC: 3.9 MMOL/L (ref 3.4–5.3)
PR INTERVAL - MUSE: 170 MS
QRS DURATION - MUSE: 86 MS
QT - MUSE: 394 MS
QTC - MUSE: 439 MS
R AXIS - MUSE: 32 DEGREES
RBC # BLD AUTO: 3.91 10E6/UL (ref 3.8–5.2)
SODIUM SERPL-SCNC: 137 MMOL/L (ref 136–145)
SYSTOLIC BLOOD PRESSURE - MUSE: NORMAL MMHG
T AXIS - MUSE: 4 DEGREES
VENTRICULAR RATE- MUSE: 75 BPM
WBC # BLD AUTO: 8 10E3/UL (ref 4–11)

## 2023-08-26 PROCEDURE — 250N000011 HC RX IP 250 OP 636: Performed by: EMERGENCY MEDICINE

## 2023-08-26 PROCEDURE — 85025 COMPLETE CBC W/AUTO DIFF WBC: CPT | Performed by: EMERGENCY MEDICINE

## 2023-08-26 PROCEDURE — 250N000009 HC RX 250: Performed by: EMERGENCY MEDICINE

## 2023-08-26 PROCEDURE — 93005 ELECTROCARDIOGRAM TRACING: CPT

## 2023-08-26 PROCEDURE — 250N000013 HC RX MED GY IP 250 OP 250 PS 637: Performed by: EMERGENCY MEDICINE

## 2023-08-26 PROCEDURE — 71275 CT ANGIOGRAPHY CHEST: CPT

## 2023-08-26 PROCEDURE — 99285 EMERGENCY DEPT VISIT HI MDM: CPT | Mod: 25

## 2023-08-26 PROCEDURE — 85379 FIBRIN DEGRADATION QUANT: CPT | Performed by: EMERGENCY MEDICINE

## 2023-08-26 PROCEDURE — 36415 COLL VENOUS BLD VENIPUNCTURE: CPT | Performed by: EMERGENCY MEDICINE

## 2023-08-26 PROCEDURE — 85014 HEMATOCRIT: CPT | Performed by: EMERGENCY MEDICINE

## 2023-08-26 PROCEDURE — 80048 BASIC METABOLIC PNL TOTAL CA: CPT | Performed by: EMERGENCY MEDICINE

## 2023-08-26 RX ORDER — IOPAMIDOL 755 MG/ML
81 INJECTION, SOLUTION INTRAVASCULAR ONCE
Status: COMPLETED | OUTPATIENT
Start: 2023-08-26 | End: 2023-08-26

## 2023-08-26 RX ORDER — ACETAMINOPHEN 500 MG
1000 TABLET ORAL ONCE
Status: DISCONTINUED | OUTPATIENT
Start: 2023-08-26 | End: 2023-08-26

## 2023-08-26 RX ORDER — BENZONATATE 100 MG/1
100 CAPSULE ORAL 3 TIMES DAILY PRN
Qty: 30 CAPSULE | Refills: 0 | Status: SHIPPED | OUTPATIENT
Start: 2023-08-26 | End: 2023-09-05

## 2023-08-26 RX ORDER — IBUPROFEN 600 MG/1
600 TABLET, FILM COATED ORAL ONCE
Status: COMPLETED | OUTPATIENT
Start: 2023-08-26 | End: 2023-08-26

## 2023-08-26 RX ADMIN — IOPAMIDOL 81 ML: 755 INJECTION, SOLUTION INTRAVENOUS at 18:14

## 2023-08-26 RX ADMIN — SODIUM CHLORIDE 100 ML: 9 INJECTION, SOLUTION INTRAVENOUS at 18:14

## 2023-08-26 RX ADMIN — IBUPROFEN 600 MG: 600 TABLET ORAL at 17:52

## 2023-08-26 ASSESSMENT — ACTIVITIES OF DAILY LIVING (ADL): ADLS_ACUITY_SCORE: 35

## 2023-08-26 NOTE — ED PROVIDER NOTES
ED ATTENDING PHYSICIAN NOTE:   I evaluated this patient in conjunction with Jessie Roman PA-C  I have participated in the care of the patient and personally performed key elements of the history, exam, and medical decision making.     Janelle Kolb is a 73 year old female who presents to the emergency department with cough and shortness of breath. The patient states she woke up yesterday with myalgias, headache, chills, rhinorrhea, and productive cough. This was accompanied by shortness of breath. She had continued symptoms today and took a home COVID test, which returned positive. Due to increased exertional shortness of breath today, she presented to the emergency department for further evaluation. She denies chest pain. She recently traveled to [place] and returned on Wednesday. She denies history of DVT/PE. She has history of breast cancer currently in remission.     Independent Historian:   None - Patient Only         EXAM:   Heart has regular rate and rhythm, lungs clear to auscultation bilaterally, GCS is 15.    Results:  ECG taken at 1833, ECG read at 1837  Normal sinus rhythm  Inferior infarct, age undetermined   Abnormal ECG  No changes as compared to prior dated 09/07/11  Rate 75 bpm. DC interval 170. QRS duration 86. QT/QTc 394/439. P-R-T axes 65 32 4.    CT Chest Pulmonary Embolism w Contrast   Final Result   IMPRESSION:   1.  No pulmonary embolus.   2.  Postradiation fibrosis changes in the upper lobes. No focal consolidation or effusion.   3.  Small hiatal hernia.   4.  Hepatic steatosis.        Labs Ordered and Resulted from Time of ED Arrival to Time of ED Departure   BASIC METABOLIC PANEL - Abnormal       Result Value    Sodium 137      Potassium 3.9      Chloride 100      Carbon Dioxide (CO2) 23      Anion Gap 14      Urea Nitrogen 13.7      Creatinine 0.63      Calcium 9.5      Glucose 140 (*)     GFR Estimate >90     D DIMER QUANTITATIVE - Abnormal    D-Dimer Quantitative 1.12 (*)     CBC WITH PLATELETS AND DIFFERENTIAL    WBC Count 8.0      RBC Count 3.91      Hemoglobin 12.4      Hematocrit 37.6      MCV 96      MCH 31.7      MCHC 33.0      RDW 14.1      Platelet Count 156      % Neutrophils 82      % Lymphocytes 13      % Monocytes 5      % Eosinophils 0      % Basophils 0      % Immature Granulocytes 0      NRBCs per 100 WBC 0      Absolute Neutrophils 6.5      Absolute Lymphocytes 1.0      Absolute Monocytes 0.4      Absolute Eosinophils 0.0      Absolute Basophils 0.0      Absolute Immature Granulocytes 0.0      Absolute NRBCs 0.0       Independent Interpretation (X-rays, CTs, rhythm strip):  None    Consultations/Discussion of Management or Tests:  None     MEDICAL DECISION MAKING/ASSESSMENT AND PLAN:   Patient presents to the ER for evaluation of COVID-like symptoms in the setting of positive COVID test at home.  She is most concerned about her shortness of breath.  No hypoxemia appreciated here in the ER.  Other vital signs are reassuring.  CT chest does not show PE or other acute intrathoracic pathology.  History not supportive of ACS or myocarditis.  She is interested in Paxlovid.  PA on the case reviewed home medications with ED pharmacist with plans to hold statin prior to initiating Paxlovid.  Return precautions discussed with patient prior to discharge.    DIAGNOSIS:   Final diagnoses:   Infection due to 2019 novel coronavirus     DISPOSITION:   Discharged to home.    Scribe Disclosure:  I, Edita Calvo, am serving as a scribe at 5:30 PM on 8/26/2023 to document services personally performed by Gabriel Stewart MD based on my observations and the provider's statements to me.      Gabriel Stewart MD  08/26/23 1947

## 2023-08-26 NOTE — ED TRIAGE NOTES
Pt reports testing positive for covid this am after recently returning from [place]. Reports feeling very unwell with frequent cough and all over body aches and fever up to 103. Took ibuprofen 1 hour PTA.      Triage Assessment       Row Name 08/26/23 1424       Triage Assessment (Adult)    Airway WDL WDL       Respiratory WDL    Respiratory WDL X;cough    Cough Frequency frequent    Cough Type productive  yellow phlegm       Skin Circulation/Temperature WDL    Skin Circulation/Temperature WDL WDL       Cardiac WDL    Cardiac WDL WDL       Peripheral/Neurovascular WDL    Peripheral Neurovascular WDL WDL

## 2023-08-26 NOTE — ED PROVIDER NOTES
History     Chief Complaint:  Cough and Shortness of Breath       HPI   Janelle Kolb is a 73 year old female with history of breast cancer, stopped letrozole 1 month ago, in remission, presenting today for evaluation of cough and shortness of breath in the setting of a positive COVID test this morning.  She woke up yesterday with body aches, headache, chills, runny nose and productive cough.  This morning, she felt worse and took her temperature which was 103  F.  Positive COVID test.  Her shortness of breath has been worsening and is worse with exertion.  She does not have any chest pain.  She recently traveled to [place] and returned on Wednesday.  No history of PE or DVT.  No unilateral calf swelling or pain.     Independent Historian:   None - Patient Only    Review of External Notes:   Office visit on 7/10/2023.    Medications:    benzonatate (TESSALON) 100 MG capsule  nirmatrelvir and ritonavir (PAXLOVID) 300 mg/100 mg therapy pack  Biotin 5 MG TBDP  Cholecalciferol (VITAMIN D) 1000 UNITS capsule  estradiol (ESTRACE) 0.1 MG/GM vaginal cream  hydrochlorothiazide (HYDRODIURIL) 25 MG tablet  ibuprofen (ADVIL,MOTRIN) 200 MG tablet  letrozole (FEMARA) 2.5 MG tablet  losartan (COZAAR) 100 MG tablet  metFORMIN (GLUCOPHAGE-XR) 500 MG 24 hr tablet  metoprolol (LOPRESSOR) 50 MG tablet  sertraline (ZOLOFT) 50 MG tablet      Past Medical History:    Past Medical History:   Diagnosis Date    Hypertension     Malignant neoplasm (H) 2012     Past Surgical History:    Past Surgical History:   Procedure Laterality Date    ABDOMEN SURGERY      ANTERIOR CRUCIATE LIGAMENT REPAIR Right     APPENDECTOMY      APPENDECTOMY      ARTHROSCOPY KNEE Bilateral     BILATERAL SALPINGOOPHORECTOMY Bilateral     for family Hx ovarian CA    BIOPSY      BREAST EXCISIONAL BIOPSY Bilateral     BREAST SURGERY       SECTION      x2    CHOLECYSTECTOMY      COLONOSCOPY      CYSTOSCOPY      STENTS IN PAST X 2, THEN REMOVED     "EXTRACORPOREAL SHOCK WAVE LITHOTRIPSY  2004, 2011    stent placed on left    FOOT ARTHRODESIS, SUBTALAR Bilateral     GENITOURINARY SURGERY      GI SURGERY      GYN SURGERY      KIDNEY STONE SURGERY      x3    LAPAROSCOPIC CHOLECYSTECTOMY N/A 1/3/2017    Procedure: LAPAROSCOPIC CHOLECYSTECTOMY;  Surgeon: Romeo Amanda MD;  Location: [facility];  Service:     LITHOTRIPSY      MASTECTOMY Bilateral 03/2012    Radical on right side, simple on left side. And Reconstruction.    MOHS MICROGRAPHIC PROCEDURE      basal cell    ORTHOPEDIC SURGERY      OTHER SURGICAL HISTORY Bilateral 05/2012    REMOVAL BREAST TISSUE EXPANDERSdue to infection    SOFT TISSUE SURGERY        Physical Exam   Patient Vitals for the past 24 hrs:   BP Temp Temp src Pulse Resp SpO2 Height Weight   08/26/23 1752 -- -- -- -- -- 95 % -- --   08/26/23 1751 129/65 -- -- 80 -- 94 % -- --   08/26/23 1423 (!) 154/69 97.2  F (36.2  C) Temporal 82 18 95 % 1.676 m (5' 6\") 120.2 kg (265 lb)      Physical Exam  /65   Pulse 80   Temp 97.2  F (36.2  C) (Temporal)   Resp 18   Ht 1.676 m (5' 6\")   Wt 120.2 kg (265 lb)   LMP 03/06/2008   SpO2 95%   BMI 42.77 kg/m     General: Laying in bed.  Wearing N95.  Appears uncomfortable.  Head: Atraumatic. Normocephalic.  EENT: PERRL. EOMI. Mucous membranes not assessed secondary to patient's COVID-positive status.  CV: Regular rate and rhythm. No appreciable murmurs, rubs, or gallops.   Respiratory: Breathing comfortably on room air.  Diffuse coarse crackles throughout.  GI: Soft, non-distended. Non-tender abdomen. No rebound, rigidity, or guarding.   Msk: Extremities without tenderness to palpation or deformity.  Mild lower extremity edema that is symmetrical.  Negative Homans' sign.  Skin: Warm and dry. No rashes.  Neuro: Awake, alert, and conversant. No focal neurologic deficits.   Psych: Appropriate mood and affect.    Emergency Department Course   ECG  ECG results from 08/26/23   EKG 12 lead     " Value    Systolic Blood Pressure     Diastolic Blood Pressure     Ventricular Rate 75    Atrial Rate 75    NM Interval 170    QRS Duration 86        QTc 439    P Axis 65    R AXIS 32    T Axis 4    Interpretation ECG      Sinus rhythm  Inferior infarct (cited on or before 26-AUG-2023)  Abnormal ECG  When compared with ECG of 07-SEP-2011 13:01,  No significant change was found  Confirmed by GENERATED REPORT, COMPUTER ([id number]),  Deborah Skinner ([id number]) on 8/26/2023 6:38:18 PM       Imaging:  CT Chest Pulmonary Embolism w Contrast   Final Result   IMPRESSION:   1.  No pulmonary embolus.   2.  Postradiation fibrosis changes in the upper lobes. No focal consolidation or effusion.   3.  Small hiatal hernia.   4.  Hepatic steatosis.         Report per radiology    Laboratory:  Labs Ordered and Resulted from Time of ED Arrival to Time of ED Departure   BASIC METABOLIC PANEL - Abnormal       Result Value    Sodium 137      Potassium 3.9      Chloride 100      Carbon Dioxide (CO2) 23      Anion Gap 14      Urea Nitrogen 13.7      Creatinine 0.63      Calcium 9.5      Glucose 140 (*)     GFR Estimate >90     D DIMER QUANTITATIVE - Abnormal    D-Dimer Quantitative 1.12 (*)    CBC WITH PLATELETS AND DIFFERENTIAL    WBC Count 8.0      RBC Count 3.91      Hemoglobin 12.4      Hematocrit 37.6      MCV 96      MCH 31.7      MCHC 33.0      RDW 14.1      Platelet Count 156      % Neutrophils 82      % Lymphocytes 13      % Monocytes 5      % Eosinophils 0      % Basophils 0      % Immature Granulocytes 0      NRBCs per 100 WBC 0      Absolute Neutrophils 6.5      Absolute Lymphocytes 1.0      Absolute Monocytes 0.4      Absolute Eosinophils 0.0      Absolute Basophils 0.0      Absolute Immature Granulocytes 0.0      Absolute NRBCs 0.0        Emergency Department Course & Assessments:  Interventions:  Medications   ibuprofen (ADVIL/MOTRIN) tablet 600 mg (600 mg Oral $Given 8/26/23 7718)   Saline (100 mLs As instructed  $Given 23)   iopamidol (ISOVUE-370) solution 81 mL (81 mLs Intravenous $Given 23)        Assessments and Consultations:  Patient was seen in conjunction with attending physician Dr. Stewart.       I performed my initial evaluation of the patient  ED Course as of 23   Sat Aug 26, 2023   1820 Spoke with the ED pharmacist Karo about medication interactions for Paxlovid.    Patient updated on results and plan.  Will be discharged to home with Paxlovid prescription and instructions for return.       Independent Interpretation (X-rays, CTs, rhythm strip):  None    Social Determinants of Health affecting care:   None    Disposition:  The patient was discharged to home.     Impression & Plan    Medical Decision Makin-year-old female with a history of breast cancer presenting as above.  Vital signs are stable upon arrival.  She is not tachycardic.  Oxygenating appropriately on room air.  She did have ibuprofen 1 hour prior to arrival and was not febrile here.  She has a known COVID-positive test this morning.  Differential diagnosis included COVID-19 symptoms, PE, pneumonia, pneumothorax, ACS.  EKG was without new ischemic changes, so did not further investigate for ACS as she likely is having symptoms from COVID.  With recent travel, known cancer history, and current COVID-19 infection did obtain a D-dimer which was positive.  CT PE study was negative for PE or focal consolidation.  At this time, I am suspicious that her symptoms are from COVID-19.  Given that her vital signs are stable here, I think she is safe for discharge to home with close outpatient monitoring.  She was interested in starting Paxlovid and she is a candidate for this with new symptoms starting yesterday.  ED pharmacist identified drug interactions and these were communicated to the patient.  She was also given a prescription for Tessalon Perles with her cough.  Strict return precautions were discussed.   I discussed the plan with the patient. All questions were answered and they were in agreement the plan. We discussed signs and symptoms that should prompt immediate reevaluation, and they vocalized understanding. Patient is safe for discharge to home.     Diagnosis:    ICD-10-CM    1. Infection due to 2019 novel coronavirus  U07.1 MyChart Care Companion COVID Pathway     [facility] Virtual Home Monitoring Referral: COVID      2. Cough  R05.9       3. Shortness of breath  R06.02          Discharge Medications:  Discharge Medication List as of 8/26/2023  7:25 PM        START taking these medications    Details   benzonatate (TESSALON) 100 MG capsule Take 1 capsule (100 mg) by mouth 3 times daily as needed for cough, Disp-30 capsule, R-0, E-Prescribe      nirmatrelvir and ritonavir (PAXLOVID) 300 mg/100 mg therapy pack Take 3 tablets by mouth 2 times daily for 5 days Take 2 Nirmatrelvir tablets and 1 Ritonavir tablet twice daily for 5 days., Disp-30 tablet, R-0, E-PrescribeIf Paxlovid unavailable and no Molnupiravir ordered, refer patient to MNRAP at [email or web address]. If Molnupiravir ordered along with Paxlovid, dispense Molnupiravir and review embryotoxicity.                Jessie Roman PA-C  08/26/23 1942

## 2023-08-27 ENCOUNTER — PATIENT OUTREACH (OUTPATIENT)
Dept: CARE COORDINATION | Facility: CLINIC | Age: 73
End: 2023-08-27
Payer: COMMERCIAL

## 2023-08-27 NOTE — PROGRESS NOTES
Clinic Care Coordination Contact    Referral Type: Virtual Home Monitoring - MyChart Care Companion    Patient referred for Virtual Home Monitoring Program following a recent diagnosis of COVID-19.     Outreach attempted x 1.  Left message on patient's voicemail, providing [facility]'s 24/7 scheduling and nurse triage phone number [phone] ([phone]) for questions/concerns and/or to schedule an appt with an [facility] provider, if they do not have a PCP.    Elise Quiles RN  [facility]  - RN Care Coordinator

## 2023-08-27 NOTE — DISCHARGE INSTRUCTIONS
You were seen in the emergency department today for evaluation of COVID-19 infection, shortness of breath, and cough.  I recommend the following:    Start Paxlovid.  There are 3 tablets involved in this, take them twice daily for 5 days.  See packet for further information.  Hold rosuvastatin for 8 days.  This has a medication interaction with Paxlovid.  Continue with Tylenol and/or ibuprofen as needed for fevers and body aches.  Tessalon Perles up to 3 times daily as needed for cough  If your symptoms are getting worse, specifically if you are having difficulty breathing, have chest pain return here        Discharge Instructions  COVID-19    COVID-19 is the disease caused by a new coronavirus. The virus spreads from person-to-person primarily by droplets when an infected person coughs or sneezes and the droplets are then breathed in by another person.    Symptoms of COVID-19  Many people have no symptoms or mild symptoms.  Symptoms usually appear within a few days, but up to 14-days, after contact with a person with COVID-19.    A mild COVID-19 illness is like a cold and can have fever, cough, sneezing, sore throat, tiredness, headache, and muscle pain.    A moderate COVID-19 illness might include shortness of breath or pneumonia on a chest x-ray.    A severe COVID-19 illness causes significant breathing problems such as low oxygen levels or more serious pneumonia.  Some patients experience loss of taste or smell which is somewhat unique to COVID-19.      Isolation and Quarantine  Testing is recommended for any person with symptoms that could be COVID-19 and often for those exposed to COVID-19. The best way to stop the spread of the virus is to avoid contact with others.    A close contact exposure is being within 6 feet of someone with COVID for 15 minutes.    Isolation refers to sick people staying away from people who are not sick.    A person in quarantine is limiting activity because they were exposed and are  waiting to see if they might become sick.    If you test positive for COVID and have no symptoms, you should stay home (isolation) for 5 full days after the day of the test. You should then wear a mask when around others for another 5 days.    If you test positive for COVID and have mild symptoms, you should stay home (isolation) for at least 5 days after your symptoms began. You can return to normal activities at that time, wearing a mask when around others, for another 5 days as long as your symptoms are improving/resolving and you have been without a fever for 24 hours (without using fever-reducing medicine).    If you test positive for COVID and have more than mild symptoms, you should stay home (isolation) for at least 10 days after your symptoms began. You can return to normal activities at that time as long as your symptoms are improving and you have been without a fever for 24 hours (without using fever-reducing medicine).  For example, if you have a fever and cough for 6 days, you need to stay home 4 more days with no fever for a total of 10 days. Or, if you have a fever and cough for 10 days, you need to stay home one more day with no fever for a total of 11 days.    If you were exposed to COVID and are not vaccinated (or it has been more than six months from your Pfizer or Moderna vaccine or two months from J&J vaccine), you should stay home (quarantine) for 5 days and then wear a mask around others for 5 additional days. A COVID test at day 5 is recommended.    If you were exposed to COVID and are vaccinated (had a booster, had two shots of Pfizer or Moderna vaccine in the last five months, or had J&J vaccine within two months), you do not need to quarantine but should wear a mask around others for 10 days and get a COVID test on day 5.    If you have symptoms but a negative test, you should stay at home until you have mild/improving symptoms and are without fever for 24 hours, using the same judgment  you would for when it is safe to return to work/school from strep throat, influenza, or the common cold. If you worsen, you should consider being re-evaluated.    If you are being tested for COVID because of symptoms and your test is pending, you should stay home until you know your test result.  More details on isolation and quarantine can be found on this website from the CDC:  https://www.cdc.gov/coronavirus/2019-ncov/your-health/quarantine-isolation.html    If I have COVID, how should I protect myself and others?    Do not go to work or school. Have a friend or relative do your shopping. Do not use public transportation (bus, train) or ridesharing (Lyft, Uber).    Separate yourself from other people in your home. As much as possible, you should stay in one room and away from other people in your home. Also, use a separate bathroom, if possible. Avoid handling pets or other animals while sick.     Wear a facemask if you need to be around other people and cover your mouth and nose with a tissue when you cough or sneeze.     Avoid sharing personal household items. You should not share dishes, drinking glasses, forks/knives/spoons, towels, or bedding with other people in your home. After using these items, they should be washed with soap and water. Clean parts of your home that are touched often (doorknobs, faucets, countertops, etc.) daily.     Wash your hands often with soap and water for at least 20 seconds or use an alcohol-based hand  containing at least 60% alcohol.     Avoid touching your face.    Treat your symptoms. You can take Acetaminophen (Tylenol) to treat body aches and fever as needed for comfort. Ibuprofen (Advil or Motrin) can be used as well if you still have symptoms after taking Tylenol. Drink fluids. Rest.    Watch for worsening symptoms such as shortness of breath/difficulty breathing or very severe weakness.    Employers/workplaces are being asked by the Centers for Disease Control  (CDC) to not request notes/documentation for you to return to work or prove that you were ill. You may choose to show your employer this paperwork. Also, repeat testing should not be required to return to work.    Exercise/Sports in rare cases, COVID could affect your heart in a way that makes exercise or participation in sports dangerous.  If you have a mild COVID illness (fever, cough, sore throat, and similar symptoms but no difficulty breathing or abnormalities of the lung): After your COVID symptoms have resolved, wait 14-days before returning to activity.  If you have more than a mild illness (meaning that you have problems with your breathing or lungs) or if you participate in competitive or strenuous activity or have a history of heart disease: Please see your primary doctor/provider prior to return to activity/competition.    COVID treatments such as antiviral and antibody medications are available. They are recommended for those patients who have a risk for developing more severe COVID illness. Importantly, the treatments must be started early in the illness (within 5-7 days, depending on which treatment). These treatments may have been considered today during your visit. If you have other questions, contact your primary doctor/clinic.     You can learn more about COVID treatments from the [facility] [place] [facility]:  https://www.health.[facility].[place]/diseases/coronavirus/meds.html    Return to the Emergency Department if:    If you are developing worsening breathing, shortness of breath, or feel worse you should seek medical attention.  If you are uncertain, contact your health care provider/clinic. If you need emergency medical attention, call 911 and tell them you have been ill.

## 2023-08-28 NOTE — PROGRESS NOTES
[facility] Contact  [facility]/Voicemail     Clinical Data: Post-Discharge Outreach     Outreach attempted x 2. Patient is currently resting.   requested call back tomorrow between 8-10am.  24/7 nurse line information given.  No patient information given as no consent on file.      Plan:  [facility] will do no further outreaches at this time.       Tracey Golden, AYE  [facility], [facility]    *Connected Care Resource Team does NOT follow patient ongoing. Referrals are identified based on internal discharge reports and the outreach is to ensure patient has an understanding of their discharge instructions.

## 2023-08-29 ENCOUNTER — NURSE TRIAGE (OUTPATIENT)
Dept: NURSING | Facility: CLINIC | Age: 73
End: 2023-08-29
Payer: COMMERCIAL

## 2023-08-29 NOTE — PROGRESS NOTES
Per chart review patient called nurse triage returning a follow up call from yesterday. 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 No questions or concerns per patient. RN CC will not make another attempt per patient's request.    Lyndsay Vallejo RN  [facility], [facility]

## 2023-08-29 NOTE — TELEPHONE ENCOUNTER
Patient was seen in ED for COVID on 8/26. Received a call and is now calling back, states symptoms are not worse but not better. 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 Will call back with any questions or concerns.  Cynthia Pelaez, RN on 8/29/2023 at 8:38 AM    Reason for Disposition   Information only question and nurse able to answer    Additional Information   Negative: Nursing judgment   Negative: Nursing judgment   Negative: Nursing judgment   Negative: Nursing judgment    Protocols used: Information Only Call - No Triage-A-[place]

## 2023-10-05 NOTE — PROGRESS NOTES
[facility]    Hematology/Oncology Established Patient Follow-up Note      Today's Date: 10/11/2023    Reason for Follow-up: Bilateral breast cancer, status post bilateral mastectomies.    HISTORY OF PRESENT ILLNESS: Janelle Kolb is a 73 year old female who presents with the following oncologic history:  1.  12/03/2011: Biopsy of right breast at 9:00 showed proliferative changes including fibrosis, adenosis, apocrine metaplasia, and ductal hyperplasia without atypia, and associated microcalcifications with no evidence of malignancy.  2.  1/04/2012: Left upper inner breast biopsy showed atypical ductal hyperplasia and atypical lobular hyperplasia with associated calcifications.  3.  1/19/2012: Underwent lumpectomies at [facility] on the bilateral breasts.  Pathology showed a 0.5 x 0.4 x 0.4 cm left upper inner breast well-differentiated invasive ductal carcinoma, grade 1 with associated DCIS, lymphovascular invasion absent.  ER was positive at 75%, DE weakly positive at 3%, HER-2/olesya negative.  Right breast showed grade 1 invasive lobular carcinoma at 9:00, measuring 4 x 2 x 2 cm, multifocal with margins multifocally positive for invasive carcinoma; lymphovascular invasion present.  No lymph nodes were removed.  4.  3/07/2012: Underwent bilateral mastectomies with axillary lymph node dissection.  Pathology showed grade 2, left breast invasive mammary carcinoma with ductal differentiation measuring 3 mm and multifocal LCIS and focal DCIS; lymphovascular invasion not identified; 2/5 axillary lymph nodes involved with size of largest metastatic deposit 9.5 mm (later felt to be an intramammary lymph node); extranodal extension not identified.  Left-sided lymph node was ER positive at 75%, DE positive at 50%, HER-2/olesya negative by IHC.  Therefore, left breast [id number].  Pathology showed multifocal right breast invasive lobular carcinoma of overlapping sites.  Right breast showed the  multiple foci measuring up to 2.3 cm, invasive ductal carcinoma, grade 1 with 10/19 lymph nodes involved (8 with macro metastases and 2 with micrometastasis), with largest metastatic tumor deposit measuring 1.3 cm, extranodal extension not identified.  ER was positive at 97%, NH positive at 91%, HER-2/olesya negative with IHC = 0+.  Therefore, right breast pT2-pN3a.  5.  4/23/2012-10/01/2012: Received adjuvant chemotherapy with 4 cycles of dose dense Adriamycin and Cytoxan followed by 1 dose of Taxol and 3 cycles of Abraxane, under the care of Dr. Cassy Germain.  6.  12/27/2012: Completed adjuvant radiation therapy to the right chest wall.  7.  3/25/2013: Completed adjuvant radiation therapy to the left chest wall.  8.  6/2013: Started adjuvant letrozole.  This was interrupted between August and September 2013 due to side effects.  9.  6/2023: Completed 10 years of letrozole.    INTERIM HISTORY:  Janelle reports she had a bad case of COVID-19 in August after returning from [place] and still has a chronic cough. She states she feels uncomfortable with discontinuing letrozole.    REVIEW OF SYSTEMS:   14 point ROS was reviewed and is negative other than as noted above in HPI.       HOME MEDICATIONS:  Current Outpatient Medications   Medication Sig Dispense Refill    Biotin 5 MG TBDP Take 5,000 mcg by mouth      estradiol (ESTRACE) 0.1 MG/GM vaginal cream Place 2 g vaginally twice a week after initial application of once daily for 2 weeks 42.5 g 3    hydrochlorothiazide (HYDRODIURIL) 25 MG tablet Take 25 mg by mouth      losartan (COZAAR) 100 MG tablet Take 100 mg by mouth      metFORMIN (GLUCOPHAGE-XR) 500 MG 24 hr tablet TAKE 1 TABLET BY MOUTH ONCE DAILY WITH EVENING MEAL.      metoprolol (LOPRESSOR) 50 MG tablet Take 50 mg by mouth daily.      sertraline (ZOLOFT) 50 MG tablet Take 75 mg by mouth           ALLERGIES:  Allergies   Allergen Reactions    Compazine [Prochlorperazine] Other (See Comments)     Severe extra  pyramidal side effects  (muscles spasms, shakiness)    Erythromycin GI Disturbance    Latex Difficulty breathing         PAST MEDICAL HISTORY:  Past Medical History:   Diagnosis Date    Hypertension     Malignant neoplasm (H) 2012    BILATERAL BREAST CANCER INTO LYMPH NODES   BRCA2 mutation testing in  was negative.  Positive history of hormone replacement therapy, discontinued in .  Kidney stones.  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      PAST SURGICAL HISTORY:  Past Surgical History:   Procedure Laterality Date    ABDOMEN SURGERY      ANTERIOR CRUCIATE LIGAMENT REPAIR Right     APPENDECTOMY      APPENDECTOMY      ARTHROSCOPY KNEE Bilateral     BILATERAL SALPINGOOPHORECTOMY Bilateral     for family Hx ovarian CA    BIOPSY      BREAST EXCISIONAL BIOPSY Bilateral     BREAST SURGERY       SECTION      x2    CHOLECYSTECTOMY      COLONOSCOPY      CYSTOSCOPY      STENTS IN PAST X 2, THEN REMOVED    EXTRACORPOREAL SHOCK WAVE LITHOTRIPSY  ,     stent placed on left    FOOT ARTHRODESIS, SUBTALAR Bilateral     GENITOURINARY SURGERY      GI SURGERY      GYN SURGERY      KIDNEY STONE SURGERY      x3    LAPAROSCOPIC CHOLECYSTECTOMY N/A 1/3/2017    Procedure: LAPAROSCOPIC CHOLECYSTECTOMY;  Surgeon: Romeo Amanda MD;  Location: Health system;  Service:     LITHOTRIPSY      MASTECTOMY Bilateral 2012    Radical on right side, simple on left side. And Reconstruction.    MOHS MICROGRAPHIC PROCEDURE      basal cell    ORTHOPEDIC SURGERY      OTHER SURGICAL HISTORY Bilateral 2012    REMOVAL BREAST TISSUE EXPANDERSdue to infection    SOFT TISSUE SURGERY           SOCIAL HISTORY:  Social History     Socioeconomic History    Marital status:      Spouse name: Not on file    Number of children: Not on file    Years of education: Not on file    Highest education level: Not on file   Occupational History    Not on file   Tobacco Use    Smoking status: Never    Smokeless tobacco:  Never   Substance and Sexual Activity    Alcohol use: Yes     Comment: occasional    Drug use: No    Sexual activity: Not on file   Other Topics Concern    Parent/sibling w/ CABG, MI or angioplasty before 65F 55M? 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      FAMILY HISTORY:  Family History   Problem Relation Age of Onset    Cancer Mother         ovarian,  at age 70    Hypertension Mother     C.A.D. Mother     Hypertension Father     Breast Cancer Other 59        cousin         PHYSICAL EXAM:  Vital signs:  /78   Pulse 80   Resp 16   Wt 117.5 kg (259 lb)   LMP 2008   SpO2 96%   BMI 41.80 kg/m    GENERAL/CONSTITUTIONAL: No acute distress.  EYES: No scleral icterus.  LYMPH: No cervical, supraclavicular, axillary adenopathy.   BREAST: Bilateral breasts are surgically absent with no palpable chest wall masses or fluid; scar tissue is stable compared to prior exam.  No erythema, rash, or ulcerations.  RESPIRATORY: No audible cough or wheezing.   GASTROINTESTINAL: No hepatosplenomegaly, masses, or tenderness. No guarding.  No distention.  MUSCULOSKELETAL: Warm and well-perfused, no cyanosis, clubbing; right arm lymphedema present.  NEUROLOGIC: No focal motor deficits. Alert, oriented, answers questions appropriately.  INTEGUMENTARY: No rashes or jaundice.  GAIT: Steady, does not use assistive device    LABS:  CBC RESULTS:   Recent Labs   Lab Test 23  1433   WBC 8.0   RBC 3.91   HGB 12.4   HCT 37.6   MCV 96   MCH 31.7   MCHC 33.0   RDW 14.1        Last Comprehensive Metabolic Panel:  Sodium   Date Value Ref Range Status    08/26/2023 137 136 - 145 mmol/L Final   07/11/2012 139 133 - 144 mmol/L Final     Potassium   Date Value Ref Range Status   08/26/2023 3.9 3.4 - 5.3 mmol/L Final   07/11/2012 3.5 3.4 - 5.3 mmol/L Final     Chloride   Date Value Ref Range Status   08/26/2023 100 98 - 107 mmol/L Final   07/11/2012 110 (H) 94 - 109 mmol/L Final     Carbon Dioxide   Date Value Ref Range Status   07/11/2012 22 20 - 32 mmol/L Final     Carbon Dioxide (CO2)   Date Value Ref Range Status   08/26/2023 23 22 - 29 mmol/L Final     Anion Gap   Date Value Ref Range Status   08/26/2023 14 7 - 15 mmol/L Final   07/11/2012 8 6 - 17 mmol/L Final     Glucose   Date Value Ref Range Status   08/26/2023 140 (H) 70 - 99 mg/dL Final   07/11/2012 99 60 - 99 mg/dL Final     Urea Nitrogen   Date Value Ref Range Status   08/26/2023 13.7 8.0 - 23.0 mg/dL Final   07/11/2012 12 7 - 30 mg/dL Final     Creatinine   Date Value Ref Range Status   08/26/2023 0.63 0.51 - 0.95 mg/dL Final   11/04/2013 0.74 0.52 - 1.04 mg/dL Final     GFR Estimate   Date Value Ref Range Status   08/26/2023 >90 >60 mL/min/1.73m2 Final   11/04/2013 80 >60 mL/min/1.7m2 Final     Calcium   Date Value Ref Range Status   08/26/2023 9.5 8.8 - 10.2 mg/dL Final   11/04/2013 10.5 (H) 8.5 - 10.4 mg/dL Final     Bilirubin Total   Date Value Ref Range Status   11/04/2013 0.8 0.2 - 1.3 mg/dL Final     Alkaline Phosphatase   Date Value Ref Range Status   11/04/2013 64 40 - 150 U/L Final     ALT   Date Value Ref Range Status   07/08/2012 29 0 - 50 U/L Final     AST   Date Value Ref Range Status   11/04/2013 33 0 - 45 U/L Final       PATHOLOGY:  None new.    IMAGING:  None new.    ASSESSMENT/PLAN:  Janelle Kolb is a 72 year old female with the following issues:  1.  Stage IIIA, right breast invasive lobular carcinoma of overlapping sites, grade 1, ER positive, GA positive, HER-2/olesya negative status post right mastectomy  2.  Stage II, left upper inner breast invasive ductal carcinoma, grade  2, ER positive, DC positive, HER-2/olesya negative, status post left mastectomy  3.  Morbid obesity  4.  Osteopenia right femoral neck  --Janelle is s/p bilateral mastectomies, adjuvant chemotherapy, adjuvant radiation, followed by 10 years of letrozole completed 6/2023.  --I discussed with Janelle that she has no clinical evidence for recurrent breast cancer by physical exam today.    --Last DEXA scan from 11/12/2021 showed mild osteopenia in the right femoral neck, mildly worse.  I advised adequate vitamin D and weightbearing exercise. Can have next DEXA scan done with her PCP.  --Continue lymphedema therapy to help with right arm lymphedema.  --Discussed in detail that letrozole beyond 10 years' use is not advised as more than 10 years of AI therapy has not been shown to improve overall survival or further reduce risk of recurrence.  Discussed that treating with AI beyond 10 years can further increase fracture risk.  Therefore I did not recommend extending AI therapy beyond 10 years.  --I did advise a continued healthy diet that is low in red meat, low in refined sugars, high in plant-based food, and increasing moderate exercise as well as incorporating high intensity exercise if able.  Discussed the data behind high intensive exercise showing potential reduce cancer risk and risk of metastasis by up to 72%.    5.  Vitamin D deficiency  - Continue vitamin D supplementation.  She declines taking calcium supplementation due to history of kidney stones.  6.  Peripheral neuropathy, drug-induced  -This is related to past taxane use but mild in her hands and feet.  Continue observation.  7.  Atrophic vaginitis  -Stable.  Continue with vaginal estrogen cream.  8. Urinary incontinence  -- She tried oxybutynin ER 15 mg once daily with brain fogginess so she stopped it.  -- The vaginal estrogen cream has been helping with her urinary incontinence.  9. Chronic cough  --Related to COVID-19 viral infection in 8/2023 and  stable.    Will give flu shot today.    Return in 1 year.    Lana Aguilar MD  Hematology/Oncology  [facility]    Total time spent today: 30 minutes in chart review, patient evaluation, counseling, documentation, test and/or medication/prescription orders, and coordination of care.

## 2023-10-11 ENCOUNTER — ONCOLOGY VISIT (OUTPATIENT)
Dept: ONCOLOGY | Facility: CLINIC | Age: 73
End: 2023-10-11
Attending: INTERNAL MEDICINE
Payer: COMMERCIAL

## 2023-10-11 VITALS
HEART RATE: 80 BPM | SYSTOLIC BLOOD PRESSURE: 136 MMHG | WEIGHT: 259 LBS | BODY MASS INDEX: 41.8 KG/M2 | OXYGEN SATURATION: 96 % | DIASTOLIC BLOOD PRESSURE: 78 MMHG | RESPIRATION RATE: 16 BRPM

## 2023-10-11 DIAGNOSIS — C50.212 MALIGNANT NEOPLASM OF UPPER-INNER QUADRANT OF LEFT BREAST IN FEMALE, ESTROGEN RECEPTOR POSITIVE (H): ICD-10-CM

## 2023-10-11 DIAGNOSIS — Z17.0 MALIGNANT NEOPLASM OF UPPER-INNER QUADRANT OF LEFT BREAST IN FEMALE, ESTROGEN RECEPTOR POSITIVE (H): ICD-10-CM

## 2023-10-11 DIAGNOSIS — R05.3 CHRONIC COUGH: ICD-10-CM

## 2023-10-11 DIAGNOSIS — C50.811 MALIGNANT NEOPLASM OF OVERLAPPING SITES OF RIGHT BREAST IN FEMALE, ESTROGEN RECEPTOR POSITIVE (H): Primary | ICD-10-CM

## 2023-10-11 DIAGNOSIS — G62.0 DRUG-INDUCED POLYNEUROPATHY (H): ICD-10-CM

## 2023-10-11 DIAGNOSIS — Z17.0 MALIGNANT NEOPLASM OF OVERLAPPING SITES OF RIGHT BREAST IN FEMALE, ESTROGEN RECEPTOR POSITIVE (H): Primary | ICD-10-CM

## 2023-10-11 DIAGNOSIS — N95.2 ATROPHIC VAGINITIS: ICD-10-CM

## 2023-10-11 PROCEDURE — 99213 OFFICE O/P EST LOW 20 MIN: CPT | Performed by: INTERNAL MEDICINE

## 2023-10-11 PROCEDURE — 99214 OFFICE O/P EST MOD 30 MIN: CPT | Performed by: INTERNAL MEDICINE

## 2023-10-11 ASSESSMENT — PAIN SCALES - GENERAL: PAINLEVEL: NO PAIN (0)

## 2023-10-11 NOTE — PROGRESS NOTES
"Oncology Rooming Note    October 11, 2023 4:25 PM   Janelle Kolb is a 73 year old female who presents for:    Chief Complaint   Patient presents with    Oncology Clinic Visit     Initial Vitals: /78   Pulse 80   Resp 16   Wt 117.5 kg (259 lb)   LMP 03/06/2008   SpO2 96%   BMI 41.80 kg/m   Estimated body mass index is 41.8 kg/m  as calculated from the following:    Height as of 8/26/23: 1.676 m (5' 6\").    Weight as of this encounter: 117.5 kg (259 lb). Body surface area is 2.34 meters squared.  No Pain (0) Comment: Data Unavailable   Patient's last menstrual period was 03/06/2008.  Allergies reviewed: Yes  Medications reviewed: Yes    Medications: Medication refills not needed today.  Pharmacy name entered into Ph[facility]:    [id number] [place] [facility], [place] - [address] [facility] [place], [place] - [address] [id number] [place], [place] - [address]    Clinical concerns:   doctor was notified.      Maryam Parks, [facility]              "

## 2023-10-11 NOTE — LETTER
10/11/2023         RE: Janelle Kolb  [address] [facility] [id number]        Dear Colleague,    Thank you for referring your patient, Janelle Kolb, to the [facility]. Please see a copy of my visit note below.    [facility]    Hematology/Oncology Established Patient Follow-up Note      Today's Date: 10/11/2023    Reason for Follow-up: Bilateral breast cancer, status post bilateral mastectomies.    HISTORY OF PRESENT ILLNESS: Janelle Kolb is a 73 year old female who presents with the following oncologic history:  1.  12/03/2011: Biopsy of right breast at 9:00 showed proliferative changes including fibrosis, adenosis, apocrine metaplasia, and ductal hyperplasia without atypia, and associated microcalcifications with no evidence of malignancy.  2.  1/04/2012: Left upper inner breast biopsy showed atypical ductal hyperplasia and atypical lobular hyperplasia with associated calcifications.  3.  1/19/2012: Underwent lumpectomies at [facility] on the bilateral breasts.  Pathology showed a 0.5 x 0.4 x 0.4 cm left upper inner breast well-differentiated invasive ductal carcinoma, grade 1 with associated DCIS, lymphovascular invasion absent.  ER was positive at 75%, OR weakly positive at 3%, HER-2/olesya negative.  Right breast showed grade 1 invasive lobular carcinoma at 9:00, measuring 4 x 2 x 2 cm, multifocal with margins multifocally positive for invasive carcinoma; lymphovascular invasion present.  No lymph nodes were removed.  4.  3/07/2012: Underwent bilateral mastectomies with axillary lymph node dissection.  Pathology showed grade 2, left breast invasive mammary carcinoma with ductal differentiation measuring 3 mm and multifocal LCIS and focal DCIS; lymphovascular invasion not identified; 2/5 axillary lymph nodes involved with size of largest metastatic deposit 9.5 mm (later felt to be an intramammary lymph node);  extranodal extension not identified.  Left-sided lymph node was ER positive at 75%, AL positive at 50%, HER-2/olesya negative by IHC.  Therefore, left breast [id number].  Pathology showed multifocal right breast invasive lobular carcinoma of overlapping sites.  Right breast showed the multiple foci measuring up to 2.3 cm, invasive ductal carcinoma, grade 1 with 10/19 lymph nodes involved (8 with macro metastases and 2 with micrometastasis), with largest metastatic tumor deposit measuring 1.3 cm, extranodal extension not identified.  ER was positive at 97%, AL positive at 91%, HER-2/olesya negative with IHC = 0+.  Therefore, right breast pT2-pN3a.  5.  4/23/2012-10/01/2012: Received adjuvant chemotherapy with 4 cycles of dose dense Adriamycin and Cytoxan followed by 1 dose of Taxol and 3 cycles of Abraxane, under the care of Dr. Cassy Germain.  6.  12/27/2012: Completed adjuvant radiation therapy to the right chest wall.  7.  3/25/2013: Completed adjuvant radiation therapy to the left chest wall.  8.  6/2013: Started adjuvant letrozole.  This was interrupted between August and September 2013 due to side effects.  9.  6/2023: Completed 10 years of letrozole.    INTERIM HISTORY:  Janelle reports she had a bad case of COVID-19 in August after returning from [place] and still has a chronic cough. She states she feels uncomfortable with discontinuing letrozole.    REVIEW OF SYSTEMS:   14 point ROS was reviewed and is negative other than as noted above in HPI.       HOME MEDICATIONS:  Current Outpatient Medications   Medication Sig Dispense Refill     Biotin 5 MG TBDP Take 5,000 mcg by mouth       estradiol (ESTRACE) 0.1 MG/GM vaginal cream Place 2 g vaginally twice a week after initial application of once daily for 2 weeks 42.5 g 3     hydrochlorothiazide (HYDRODIURIL) 25 MG tablet Take 25 mg by mouth       losartan (COZAAR) 100 MG tablet Take 100 mg by mouth       metFORMIN (GLUCOPHAGE-XR) 500 MG 24 hr tablet TAKE 1 TABLET BY MOUTH  ONCE DAILY WITH EVENING MEAL.       metoprolol (LOPRESSOR) 50 MG tablet Take 50 mg by mouth daily.       sertraline (ZOLOFT) 50 MG tablet Take 75 mg by mouth           ALLERGIES:  Allergies   Allergen Reactions     Compazine [Prochlorperazine] Other (See Comments)     Severe extra pyramidal side effects  (muscles spasms, shakiness)     Erythromycin GI Disturbance     Latex Difficulty breathing         PAST MEDICAL HISTORY:  Past Medical History:   Diagnosis Date     Hypertension      Malignant neoplasm (H) 2012    BILATERAL BREAST CANCER INTO LYMPH NODES   BRCA2 mutation testing in  was negative.  Positive history of hormone replacement therapy, discontinued in .  Kidney stones.  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      PAST SURGICAL HISTORY:  Past Surgical History:   Procedure Laterality Date     ABDOMEN SURGERY       ANTERIOR CRUCIATE LIGAMENT REPAIR Right      APPENDECTOMY       APPENDECTOMY       ARTHROSCOPY KNEE Bilateral      BILATERAL SALPINGOOPHORECTOMY Bilateral     for family Hx ovarian CA     BIOPSY       BREAST EXCISIONAL BIOPSY Bilateral      BREAST SURGERY        SECTION      x2     CHOLECYSTECTOMY       COLONOSCOPY       CYSTOSCOPY      STENTS IN PAST X 2, THEN REMOVED     EXTRACORPOREAL SHOCK WAVE LITHOTRIPSY  ,     stent placed on left     FOOT ARTHRODESIS, SUBTALAR Bilateral      GENITOURINARY SURGERY       GI SURGERY       GYN SURGERY       KIDNEY STONE SURGERY      x3     LAPAROSCOPIC CHOLECYSTECTOMY N/A 1/3/2017    Procedure: LAPAROSCOPIC CHOLECYSTECTOMY;  Surgeon: Romeo Amanda MD;  Location: [facility];  Service:      LITHOTRIPSY       MASTECTOMY Bilateral 2012    Radical on right side, simple on left side. And Reconstruction.     MOHS MICROGRAPHIC PROCEDURE      basal cell     ORTHOPEDIC SURGERY       OTHER SURGICAL HISTORY Bilateral 2012    REMOVAL BREAST TISSUE EXPANDERSdue to infection     SOFT TISSUE SURGERY           SOCIAL  HISTORY:  Social History     Socioeconomic History     Marital status:      Spouse name: Not on file     Number of children: Not on file     Years of education: Not on file     Highest education level: Not on file   Occupational History     Not on file   Tobacco Use     Smoking status: Never     Smokeless tobacco: Never   Substance and Sexual Activity     Alcohol use: Yes     Comment: occasional     Drug use: No     Sexual activity: Not on file   Other Topics Concern     Parent/sibling w/ CABG, MI or angioplasty before 65F 55M? 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      FAMILY HISTORY:  Family History   Problem Relation Age of Onset     Cancer Mother         ovarian,  at age 70     Hypertension Mother      C.A.D. Mother      Hypertension Father      Breast Cancer Other 59        cousin         PHYSICAL EXAM:  Vital signs:  /78   Pulse 80   Resp 16   Wt 117.5 kg (259 lb)   LMP 2008   SpO2 96%   BMI 41.80 kg/m    GENERAL/CONSTITUTIONAL: No acute distress.  EYES: No scleral icterus.  LYMPH: No cervical, supraclavicular, axillary adenopathy.   BREAST: Bilateral breasts are surgically absent with no palpable chest wall masses or fluid; scar tissue is stable compared to prior exam.  No erythema, rash, or ulcerations.  RESPIRATORY: No audible cough or wheezing.   GASTROINTESTINAL: No hepatosplenomegaly, masses, or tenderness. No guarding.  No distention.  MUSCULOSKELETAL: Warm and well-perfused, no cyanosis, clubbing; right arm lymphedema present.  NEUROLOGIC: No focal motor deficits. Alert, oriented, answers questions  appropriately.  INTEGUMENTARY: No rashes or jaundice.  GAIT: Steady, does not use assistive device    LABS:  CBC RESULTS:   Recent Labs   Lab Test 08/26/23  1433   WBC 8.0   RBC 3.91   HGB 12.4   HCT 37.6   MCV 96   MCH 31.7   MCHC 33.0   RDW 14.1        Last Comprehensive Metabolic Panel:  Sodium   Date Value Ref Range Status   08/26/2023 137 136 - 145 mmol/L Final   07/11/2012 139 133 - 144 mmol/L Final     Potassium   Date Value Ref Range Status   08/26/2023 3.9 3.4 - 5.3 mmol/L Final   07/11/2012 3.5 3.4 - 5.3 mmol/L Final     Chloride   Date Value Ref Range Status   08/26/2023 100 98 - 107 mmol/L Final   07/11/2012 110 (H) 94 - 109 mmol/L Final     Carbon Dioxide   Date Value Ref Range Status   07/11/2012 22 20 - 32 mmol/L Final     Carbon Dioxide (CO2)   Date Value Ref Range Status   08/26/2023 23 22 - 29 mmol/L Final     Anion Gap   Date Value Ref Range Status   08/26/2023 14 7 - 15 mmol/L Final   07/11/2012 8 6 - 17 mmol/L Final     Glucose   Date Value Ref Range Status   08/26/2023 140 (H) 70 - 99 mg/dL Final   07/11/2012 99 60 - 99 mg/dL Final     Urea Nitrogen   Date Value Ref Range Status   08/26/2023 13.7 8.0 - 23.0 mg/dL Final   07/11/2012 12 7 - 30 mg/dL Final     Creatinine   Date Value Ref Range Status   08/26/2023 0.63 0.51 - 0.95 mg/dL Final   11/04/2013 0.74 0.52 - 1.04 mg/dL Final     GFR Estimate   Date Value Ref Range Status   08/26/2023 >90 >60 mL/min/1.73m2 Final   11/04/2013 80 >60 mL/min/1.7m2 Final     Calcium   Date Value Ref Range Status   08/26/2023 9.5 8.8 - 10.2 mg/dL Final   11/04/2013 10.5 (H) 8.5 - 10.4 mg/dL Final     Bilirubin Total   Date Value Ref Range Status   11/04/2013 0.8 0.2 - 1.3 mg/dL Final     Alkaline Phosphatase   Date Value Ref Range Status   11/04/2013 64 40 - 150 U/L Final     ALT   Date Value Ref Range Status   07/08/2012 29 0 - 50 U/L Final     AST   Date Value Ref Range Status   11/04/2013 33 0 - 45 U/L Final       PATHOLOGY:  None  new.    IMAGING:  None new.    ASSESSMENT/PLAN:  Janelle Kolb is a 72 year old female with the following issues:  1.  Stage IIIA, right breast invasive lobular carcinoma of overlapping sites, grade 1, ER positive, CO positive, HER-2/olesya negative status post right mastectomy  2.  Stage II, left upper inner breast invasive ductal carcinoma, grade 2, ER positive, CO positive, HER-2/olesya negative, status post left mastectomy  3.  Morbid obesity  4.  Osteopenia right femoral neck  --Janelle is s/p bilateral mastectomies, adjuvant chemotherapy, adjuvant radiation, followed by 10 years of letrozole completed 6/2023.  --I discussed with Janelle that she has no clinical evidence for recurrent breast cancer by physical exam today.    --Last DEXA scan from 11/12/2021 showed mild osteopenia in the right femoral neck, mildly worse.  I advised adequate vitamin D and weightbearing exercise. Can have next DEXA scan done with her PCP.  --Continue lymphedema therapy to help with right arm lymphedema.  --Discussed in detail that letrozole beyond 10 years' use is not advised as more than 10 years of AI therapy has not been shown to improve overall survival or further reduce risk of recurrence.  Discussed that treating with AI beyond 10 years can further increase fracture risk.  Therefore I did not recommend extending AI therapy beyond 10 years.  --I did advise a continued healthy diet that is low in red meat, low in refined sugars, high in plant-based food, and increasing moderate exercise as well as incorporating high intensity exercise if able.  Discussed the data behind high intensive exercise showing potential reduce cancer risk and risk of metastasis by up to 72%.    5.  Vitamin D deficiency  - Continue vitamin D supplementation.  She declines taking calcium supplementation due to history of kidney stones.  6.  Peripheral neuropathy, drug-induced  -This is related to past taxane use but mild in her hands and feet.  Continue  "observation.  7.  Atrophic vaginitis  -Stable.  Continue with vaginal estrogen cream.  8. Urinary incontinence  -- She tried oxybutynin ER 15 mg once daily with brain fogginess so she stopped it.  -- The vaginal estrogen cream has been helping with her urinary incontinence.  9. Chronic cough  --Related to COVID-19 viral infection in 8/2023 and stable.    Will give flu shot today.    Return in 1 year.    Lana Aguilar MD  Hematology/Oncology  [facility]    Total time spent today: 30 minutes in chart review, patient evaluation, counseling, documentation, test and/or medication/prescription orders, and coordination of care.     Oncology Rooming Note    October 11, 2023 4:25 PM   Janelle Kolb is a 73 year old female who presents for:    Chief Complaint   Patient presents with     Oncology Clinic Visit     Initial Vitals: /78   Pulse 80   Resp 16   Wt 117.5 kg (259 lb)   LMP 03/06/2008   SpO2 96%   BMI 41.80 kg/m   Estimated body mass index is 41.8 kg/m  as calculated from the following:    Height as of 8/26/23: 1.676 m (5' 6\").    Weight as of this encounter: 117.5 kg (259 lb). Body surface area is 2.34 meters squared.  No Pain (0) Comment: Data Unavailable   Patient's last menstrual period was 03/06/2008.  Allergies reviewed: Yes  Medications reviewed: Yes    Medications: Medication refills not needed today.  Pharmacy name entered into My [employer]:    [id number] [place] [facility] [address] [place] [address] PHARMACY [facility] [address] [id number] [place], [place] - [address]    Clinical concerns:   doctor was notified.      [facility]                Again, thank you for allowing me to participate in the care of your patient.        Sincerely,        Lana Aguilar MD  "

## 2024-08-25 ENCOUNTER — HEALTH MAINTENANCE LETTER (OUTPATIENT)
Age: 74
End: 2024-08-25

## 2025-08-23 ENCOUNTER — HEALTH MAINTENANCE LETTER (OUTPATIENT)
Age: 75
End: 2025-08-23